# Patient Record
Sex: FEMALE | Race: WHITE | URBAN - METROPOLITAN AREA
[De-identification: names, ages, dates, MRNs, and addresses within clinical notes are randomized per-mention and may not be internally consistent; named-entity substitution may affect disease eponyms.]

---

## 2020-02-24 ENCOUNTER — APPOINTMENT (RX ONLY)
Dept: URBAN - METROPOLITAN AREA CLINIC 23 | Facility: CLINIC | Age: 75
Setting detail: DERMATOLOGY
End: 2020-02-24

## 2020-02-24 DIAGNOSIS — B35.1 TINEA UNGUIUM: ICD-10-CM

## 2020-02-24 DIAGNOSIS — L29.8 OTHER PRURITUS: ICD-10-CM

## 2020-02-24 DIAGNOSIS — D18.0 HEMANGIOMA: ICD-10-CM

## 2020-02-24 DIAGNOSIS — L82.0 INFLAMED SEBORRHEIC KERATOSIS: ICD-10-CM

## 2020-02-24 DIAGNOSIS — L73.9 FOLLICULAR DISORDER, UNSPECIFIED: ICD-10-CM

## 2020-02-24 DIAGNOSIS — L29.89 OTHER PRURITUS: ICD-10-CM

## 2020-02-24 DIAGNOSIS — D22 MELANOCYTIC NEVI: ICD-10-CM

## 2020-02-24 DIAGNOSIS — L81.4 OTHER MELANIN HYPERPIGMENTATION: ICD-10-CM

## 2020-02-24 DIAGNOSIS — L68.0 HIRSUTISM: ICD-10-CM

## 2020-02-24 DIAGNOSIS — L663 OTHER SPECIFIED DISEASES OF HAIR AND HAIR FOLLICLES: ICD-10-CM

## 2020-02-24 DIAGNOSIS — L738 OTHER SPECIFIED DISEASES OF HAIR AND HAIR FOLLICLES: ICD-10-CM

## 2020-02-24 PROBLEM — D18.01 HEMANGIOMA OF SKIN AND SUBCUTANEOUS TISSUE: Status: ACTIVE | Noted: 2020-02-24

## 2020-02-24 PROBLEM — E78.5 HYPERLIPIDEMIA, UNSPECIFIED: Status: ACTIVE | Noted: 2020-02-24

## 2020-02-24 PROBLEM — D22.5 MELANOCYTIC NEVI OF TRUNK: Status: ACTIVE | Noted: 2020-02-24

## 2020-02-24 PROBLEM — M12.9 ARTHROPATHY, UNSPECIFIED: Status: ACTIVE | Noted: 2020-02-24

## 2020-02-24 PROBLEM — L02.02 FURUNCLE OF FACE: Status: ACTIVE | Noted: 2020-02-24

## 2020-02-24 PROCEDURE — ? OTHER

## 2020-02-24 PROCEDURE — 99203 OFFICE O/P NEW LOW 30 MIN: CPT | Mod: 25

## 2020-02-24 PROCEDURE — ? PRESCRIPTION

## 2020-02-24 PROCEDURE — ? LIQUID NITROGEN

## 2020-02-24 PROCEDURE — 17110 DESTRUCTION B9 LES UP TO 14: CPT

## 2020-02-24 PROCEDURE — ? COUNSELING

## 2020-02-24 PROCEDURE — ? TREATMENT REGIMEN

## 2020-02-24 RX ORDER — CLINDAMYCIN PHOSPHATE 10 MG/G
GEL TOPICAL
Qty: 1 | Refills: 0 | Status: ERX | COMMUNITY
Start: 2020-02-24 | End: 2022-01-26

## 2020-02-24 RX ADMIN — CLINDAMYCIN PHOSPHATE: 10 GEL TOPICAL at 00:00

## 2020-02-24 ASSESSMENT — LOCATION DETAILED DESCRIPTION DERM
LOCATION DETAILED: RIGHT CHIN
LOCATION DETAILED: LEFT MID-UPPER BACK
LOCATION DETAILED: LEFT GREAT TOENAIL
LOCATION DETAILED: EPIGASTRIC SKIN
LOCATION DETAILED: RIGHT DISTAL DORSAL FOREARM
LOCATION DETAILED: UPPER STERNUM
LOCATION DETAILED: LEFT INFERIOR LATERAL NECK
LOCATION DETAILED: RIGHT ANTERIOR DISTAL THIGH
LOCATION DETAILED: LEFT DISTAL ULNAR DORSAL FOREARM
LOCATION DETAILED: LEFT INFERIOR MEDIAL UPPER BACK
LOCATION DETAILED: RIGHT DISTAL PRETIBIAL REGION
LOCATION DETAILED: LEFT DISTAL PRETIBIAL REGION
LOCATION DETAILED: LEFT CHIN
LOCATION DETAILED: SUPERIOR THORACIC SPINE

## 2020-02-24 ASSESSMENT — LOCATION SIMPLE DESCRIPTION DERM
LOCATION SIMPLE: LEFT FOREARM
LOCATION SIMPLE: RIGHT PRETIBIAL REGION
LOCATION SIMPLE: LEFT GREAT TOE
LOCATION SIMPLE: LEFT ANTERIOR NECK
LOCATION SIMPLE: RIGHT FOREARM
LOCATION SIMPLE: UPPER BACK
LOCATION SIMPLE: LEFT PRETIBIAL REGION
LOCATION SIMPLE: LEFT UPPER BACK
LOCATION SIMPLE: CHIN
LOCATION SIMPLE: CHEST
LOCATION SIMPLE: RIGHT THIGH
LOCATION SIMPLE: ABDOMEN

## 2020-02-24 ASSESSMENT — LOCATION ZONE DERM
LOCATION ZONE: LEG
LOCATION ZONE: FACE
LOCATION ZONE: NECK
LOCATION ZONE: TOENAIL
LOCATION ZONE: ARM
LOCATION ZONE: TRUNK

## 2020-02-24 NOTE — PROCEDURE: OTHER
Note Text (......Xxx Chief Complaint.): This diagnosis correlates with the
Detail Level: Simple
Other (Free Text): Recommended Laser hair removal

## 2020-02-24 NOTE — PROCEDURE: LIQUID NITROGEN
Consent: The patient's consent was obtained including but not limited to risks of crusting, scabbing, blistering, scarring, darker or lighter pigmentary change, recurrence, incomplete removal and infection.
Include Z78.9 (Other Specified Conditions Influencing Health Status) As An Associated Diagnosis?: No
Detail Level: Detailed
Medical Necessity Information: It is in your best interest to select a reason for this procedure from the list below. All of these items fulfill various CMS LCD requirements except the new and changing color options.
Post-Care Instructions: I reviewed with the patient in detail post-care instructions. Patient is to wear sunprotection, and avoid picking at any of the treated lesions. Pt may apply Vaseline to crusted or scabbing areas.
Medical Necessity Clause: This procedure was medically necessary because the lesions that were treated were:irritated

## 2021-03-30 ENCOUNTER — APPOINTMENT (RX ONLY)
Dept: URBAN - METROPOLITAN AREA CLINIC 24 | Facility: CLINIC | Age: 76
Setting detail: DERMATOLOGY
End: 2021-03-30

## 2021-03-30 DIAGNOSIS — L85.3 XEROSIS CUTIS: ICD-10-CM

## 2021-03-30 DIAGNOSIS — B35.1 TINEA UNGUIUM: ICD-10-CM

## 2021-03-30 DIAGNOSIS — Z41.9 ENCOUNTER FOR PROCEDURE FOR PURPOSES OTHER THAN REMEDYING HEALTH STATE, UNSPECIFIED: ICD-10-CM

## 2021-03-30 DIAGNOSIS — L82.1 OTHER SEBORRHEIC KERATOSIS: ICD-10-CM

## 2021-03-30 DIAGNOSIS — L81.4 OTHER MELANIN HYPERPIGMENTATION: ICD-10-CM

## 2021-03-30 DIAGNOSIS — L57.8 OTHER SKIN CHANGES DUE TO CHRONIC EXPOSURE TO NONIONIZING RADIATION: ICD-10-CM

## 2021-03-30 DIAGNOSIS — D18.0 HEMANGIOMA: ICD-10-CM

## 2021-03-30 DIAGNOSIS — L57.0 ACTINIC KERATOSIS: ICD-10-CM

## 2021-03-30 DIAGNOSIS — D22 MELANOCYTIC NEVI: ICD-10-CM

## 2021-03-30 DIAGNOSIS — L91.8 OTHER HYPERTROPHIC DISORDERS OF THE SKIN: ICD-10-CM

## 2021-03-30 PROBLEM — D18.01 HEMANGIOMA OF SKIN AND SUBCUTANEOUS TISSUE: Status: ACTIVE | Noted: 2021-03-30

## 2021-03-30 PROBLEM — F41.9 ANXIETY DISORDER, UNSPECIFIED: Status: ACTIVE | Noted: 2021-03-30

## 2021-03-30 PROBLEM — I10 ESSENTIAL (PRIMARY) HYPERTENSION: Status: ACTIVE | Noted: 2021-03-30

## 2021-03-30 PROBLEM — D22.5 MELANOCYTIC NEVI OF TRUNK: Status: ACTIVE | Noted: 2021-03-30

## 2021-03-30 PROCEDURE — 17000 DESTRUCT PREMALG LESION: CPT | Mod: 59

## 2021-03-30 PROCEDURE — 11200 RMVL SKIN TAGS UP TO&INC 15: CPT

## 2021-03-30 PROCEDURE — ? OTHER

## 2021-03-30 PROCEDURE — 99214 OFFICE O/P EST MOD 30 MIN: CPT | Mod: 25

## 2021-03-30 PROCEDURE — ? COUNSELING

## 2021-03-30 PROCEDURE — ? LIQUID NITROGEN

## 2021-03-30 PROCEDURE — ? SKIN TAG REMOVAL

## 2021-03-30 ASSESSMENT — LOCATION SIMPLE DESCRIPTION DERM
LOCATION SIMPLE: RIGHT GREAT TOE
LOCATION SIMPLE: RIGHT PRETIBIAL REGION
LOCATION SIMPLE: RIGHT BACK
LOCATION SIMPLE: RIGHT LOWER BACK
LOCATION SIMPLE: LEFT ANTERIOR AXILLA
LOCATION SIMPLE: LEFT UPPER BACK
LOCATION SIMPLE: ABDOMEN
LOCATION SIMPLE: RIGHT FOREARM
LOCATION SIMPLE: LEFT GREAT TOE
LOCATION SIMPLE: LEFT FOREARM
LOCATION SIMPLE: RIGHT POPLITEAL SKIN
LOCATION SIMPLE: UPPER BACK
LOCATION SIMPLE: RIGHT NOSE
LOCATION SIMPLE: CHIN
LOCATION SIMPLE: RIGHT SHOULDER

## 2021-03-30 ASSESSMENT — LOCATION DETAILED DESCRIPTION DERM
LOCATION DETAILED: EPIGASTRIC SKIN
LOCATION DETAILED: RIGHT PROXIMAL DORSAL FOREARM
LOCATION DETAILED: SUBXIPHOID
LOCATION DETAILED: LEFT MEDIAL UPPER BACK
LOCATION DETAILED: RIGHT GREAT TOENAIL
LOCATION DETAILED: RIGHT DISTAL PRETIBIAL REGION
LOCATION DETAILED: RIGHT NASAL SIDEWALL
LOCATION DETAILED: RIGHT POPLITEAL SKIN
LOCATION DETAILED: RIGHT CHIN
LOCATION DETAILED: INFERIOR THORACIC SPINE
LOCATION DETAILED: LEFT PROXIMAL DORSAL FOREARM
LOCATION DETAILED: LEFT GREAT TOENAIL
LOCATION DETAILED: RIGHT SUPERIOR LATERAL UPPER BACK
LOCATION DETAILED: RIGHT INFERIOR LATERAL MIDBACK
LOCATION DETAILED: LEFT ANTERIOR AXILLA
LOCATION DETAILED: PERIUMBILICAL SKIN
LOCATION DETAILED: RIGHT PROXIMAL PRETIBIAL REGION
LOCATION DETAILED: RIGHT ANTERIOR SHOULDER

## 2021-03-30 ASSESSMENT — LOCATION ZONE DERM
LOCATION ZONE: LEG
LOCATION ZONE: TOENAIL
LOCATION ZONE: TRUNK
LOCATION ZONE: NOSE
LOCATION ZONE: ARM
LOCATION ZONE: FACE
LOCATION ZONE: AXILLAE

## 2021-03-30 NOTE — PROCEDURE: SKIN TAG REMOVAL
Anesthesia Type: 1% lidocaine with epinephrine and a 1:10 solution of 8.4% sodium bicarbonate
Include Z78.9 (Other Specified Conditions Influencing Health Status) As An Associated Diagnosis?: No
Consent: Written consent obtained and the risks of skin tag removal was reviewed with the patient including but not limited to bleeding, pigmentary change, infection, pain, and remote possibility of scarring.
Add Associated Diagnoses If Applicable When Selecting Medical Necessity: Yes
Medical Necessity Clause: This procedure was medically necessary because the lesions that were treated were:rubbed by patient, deodorant
Detail Level: Detailed
Hemostasis: Aluminum Chloride
Medical Necessity Information: It is in your best interest to select a reason for this procedure from the list below. All of these items fulfill various CMS LCD requirements except the new and changing color options.
Anesthesia Volume In Cc: 1

## 2021-03-30 NOTE — PROCEDURE: OTHER
Note Text (......Xxx Chief Complaint.): This diagnosis correlates with the
Detail Level: Simple
Other (Free Text): Pt was given 2 options for treatment. Vaniqa to laser hair removal.
Other (Free Text): Pt recommended to use CeraVe anti-itch.
Render Risk Assessment In Note?: no
Other (Free Text): Pt was recommended to see Dr. Miranda.

## 2022-01-26 ENCOUNTER — APPOINTMENT (RX ONLY)
Dept: URBAN - METROPOLITAN AREA CLINIC 23 | Facility: CLINIC | Age: 77
Setting detail: DERMATOLOGY
End: 2022-01-26

## 2022-01-26 DIAGNOSIS — L82.1 OTHER SEBORRHEIC KERATOSIS: ICD-10-CM

## 2022-01-26 DIAGNOSIS — D22 MELANOCYTIC NEVI: ICD-10-CM

## 2022-01-26 DIAGNOSIS — L57.8 OTHER SKIN CHANGES DUE TO CHRONIC EXPOSURE TO NONIONIZING RADIATION: ICD-10-CM

## 2022-01-26 DIAGNOSIS — D18.0 HEMANGIOMA: ICD-10-CM

## 2022-01-26 DIAGNOSIS — L82.0 INFLAMED SEBORRHEIC KERATOSIS: ICD-10-CM

## 2022-01-26 PROBLEM — D18.01 HEMANGIOMA OF SKIN AND SUBCUTANEOUS TISSUE: Status: ACTIVE | Noted: 2022-01-26

## 2022-01-26 PROBLEM — F32.9 MAJOR DEPRESSIVE DISORDER, SINGLE EPISODE, UNSPECIFIED: Status: ACTIVE | Noted: 2022-01-26

## 2022-01-26 PROBLEM — D22.5 MELANOCYTIC NEVI OF TRUNK: Status: ACTIVE | Noted: 2022-01-26

## 2022-01-26 PROCEDURE — ? COUNSELING

## 2022-01-26 PROCEDURE — ? LIQUID NITROGEN

## 2022-01-26 PROCEDURE — 99213 OFFICE O/P EST LOW 20 MIN: CPT | Mod: 25

## 2022-01-26 PROCEDURE — 17110 DESTRUCTION B9 LES UP TO 14: CPT

## 2022-01-26 ASSESSMENT — LOCATION DETAILED DESCRIPTION DERM
LOCATION DETAILED: PERIUMBILICAL SKIN
LOCATION DETAILED: RIGHT POPLITEAL SKIN
LOCATION DETAILED: RIGHT PROXIMAL DORSAL FOREARM
LOCATION DETAILED: LEFT INFERIOR LATERAL NECK
LOCATION DETAILED: SUBXIPHOID
LOCATION DETAILED: RIGHT INFERIOR LATERAL NECK
LOCATION DETAILED: LEFT CLAVICULAR NECK
LOCATION DETAILED: RIGHT CLAVICULAR NECK
LOCATION DETAILED: RIGHT DISTAL PRETIBIAL REGION
LOCATION DETAILED: LEFT MEDIAL UPPER BACK
LOCATION DETAILED: RIGHT INFERIOR LATERAL MIDBACK
LOCATION DETAILED: LEFT PROXIMAL DORSAL FOREARM
LOCATION DETAILED: EPIGASTRIC SKIN
LOCATION DETAILED: INFERIOR THORACIC SPINE

## 2022-01-26 ASSESSMENT — LOCATION ZONE DERM
LOCATION ZONE: NECK
LOCATION ZONE: ARM
LOCATION ZONE: LEG
LOCATION ZONE: TRUNK

## 2022-01-26 ASSESSMENT — LOCATION SIMPLE DESCRIPTION DERM
LOCATION SIMPLE: LEFT ANTERIOR NECK
LOCATION SIMPLE: LEFT UPPER BACK
LOCATION SIMPLE: RIGHT POPLITEAL SKIN
LOCATION SIMPLE: RIGHT ANTERIOR NECK
LOCATION SIMPLE: UPPER BACK
LOCATION SIMPLE: RIGHT FOREARM
LOCATION SIMPLE: ABDOMEN
LOCATION SIMPLE: LEFT FOREARM
LOCATION SIMPLE: RIGHT PRETIBIAL REGION
LOCATION SIMPLE: RIGHT LOWER BACK

## 2022-01-26 NOTE — PROCEDURE: LIQUID NITROGEN
Show Applicator Variable?: Yes
Medical Necessity Clause: This procedure was medically necessary because the lesions that were treated were:irritated
Consent: The patient's consent was obtained including but not limited to risks of crusting, scabbing, blistering, scarring, darker or lighter pigmentary change, recurrence, incomplete removal and infection.
Include Z78.9 (Other Specified Conditions Influencing Health Status) As An Associated Diagnosis?: No
Medical Necessity Information: It is in your best interest to select a reason for this procedure from the list below. All of these items fulfill various CMS LCD requirements except the new and changing color options.
Detail Level: Detailed
Post-Care Instructions: I reviewed with the patient in detail post-care instructions. Patient is to wear sunprotection, and avoid picking at any of the treated lesions. Pt may apply Vaseline to crusted or scabbing areas.
Spray Paint Text: The liquid nitrogen was applied to the skin utilizing a spray paint frosting technique.

## 2022-07-13 ENCOUNTER — APPOINTMENT (RX ONLY)
Dept: URBAN - METROPOLITAN AREA CLINIC 330 | Facility: CLINIC | Age: 77
Setting detail: DERMATOLOGY
End: 2022-07-13

## 2022-07-13 DIAGNOSIS — D18.0 HEMANGIOMA: ICD-10-CM

## 2022-07-13 DIAGNOSIS — L91.8 OTHER HYPERTROPHIC DISORDERS OF THE SKIN: ICD-10-CM

## 2022-07-13 DIAGNOSIS — L82.1 OTHER SEBORRHEIC KERATOSIS: ICD-10-CM

## 2022-07-13 PROBLEM — D18.01 HEMANGIOMA OF SKIN AND SUBCUTANEOUS TISSUE: Status: ACTIVE | Noted: 2022-07-13

## 2022-07-13 PROCEDURE — ? LIQUID NITROGEN (COSMETIC)

## 2022-07-13 PROCEDURE — ? OTHER

## 2022-07-13 PROCEDURE — ? COUNSELING

## 2022-07-13 PROCEDURE — ? SKIN TAG REMOVAL (COSMETIC)

## 2022-07-13 PROCEDURE — ? BENIGN DESTRUCTION COSMETIC

## 2022-07-13 ASSESSMENT — LOCATION SIMPLE DESCRIPTION DERM
LOCATION SIMPLE: LEFT SHOULDER
LOCATION SIMPLE: POSTERIOR NECK
LOCATION SIMPLE: RIGHT ANTERIOR NECK
LOCATION SIMPLE: SUPERIOR FOREHEAD
LOCATION SIMPLE: RIGHT SHOULDER

## 2022-07-13 ASSESSMENT — LOCATION ZONE DERM
LOCATION ZONE: ARM
LOCATION ZONE: FACE
LOCATION ZONE: NECK

## 2022-07-13 ASSESSMENT — LOCATION DETAILED DESCRIPTION DERM
LOCATION DETAILED: RIGHT POSTERIOR NECK
LOCATION DETAILED: SUPERIOR MID FOREHEAD
LOCATION DETAILED: RIGHT CLAVICULAR NECK
LOCATION DETAILED: LEFT ANTERIOR SHOULDER
LOCATION DETAILED: RIGHT ANTERIOR SHOULDER

## 2022-07-13 NOTE — PROCEDURE: OTHER
Detail Level: Generalized
Other (Free Text): Patient consent was obtained to proceed with the visit and recommended plan of care after discussion of all risks and benefits, including the risks of COVID-19 exposure.
Note Text (......Xxx Chief Complaint.): This diagnosis correlates with the
Render Risk Assessment In Note?: yes
Other (Free Text): Discussed cosmetic removal, quoted patient $10 per lesion, patient would like removal today

## 2022-07-13 NOTE — PROCEDURE: SKIN TAG REMOVAL (COSMETIC)
Price (Use Numbers Only, No Special Characters Or $): 10
Detail Level: Detailed
Removed With: gradle excision
Anesthesia Volume In Cc: 3
Consent: Written consent obtained and the risks of skin tag removal was reviewed with the patient including but not limited to bleeding, pigmentary change, infection, pain, and remote possibility of scarring.

## 2022-07-13 NOTE — PROCEDURE: MIPS QUALITY
Quality 111:Pneumonia Vaccination Status For Older Adults: Pneumococcal vaccine administered on or after patient’s 60th birthday and before the end of the measurement period
Detail Level: Detailed
Quality 47: Advance Care Plan: Advance care planning not documented, reason not otherwise specified.
Quality 110: Preventive Care And Screening: Influenza Immunization: Influenza Immunization Administered during Influenza season
Quality 130: Documentation Of Current Medications In The Medical Record: Current Medications Documented
Quality 431: Preventive Care And Screening: Unhealthy Alcohol Use - Screening: Patient not identified as an unhealthy alcohol user when screened for unhealthy alcohol use using a systematic screening method
Quality 226: Preventive Care And Screening: Tobacco Use: Screening And Cessation Intervention: Patient screened for tobacco use and is an ex/non-smoker
Quality 402: Tobacco Use And Help With Quitting Among Adolescents: Patient screened for tobacco and never smoked

## 2022-07-13 NOTE — PROCEDURE: LIQUID NITROGEN (COSMETIC)
Post-Care Instructions: I reviewed with the patient in detail post-care instructions. Patient is to wear sunprotection, and avoid picking at any of the treated lesions. Pt may apply Vaseline to crusted or scabbing areas.
Render Post-Care Instructions In Note?: no
Price (Use Numbers Only, No Special Characters Or $): 20
Consent: The patient's consent was obtained including but not limited to risks of crusting, scabbing, blistering, scarring, darker or lighter pigmentary change, recurrence, incomplete removal and infection. The patient understands that the procedure is cosmetic in nature and is not covered by insurance.
Billing Information: Bill by Static Price
Show Spray Paint Technique Variable?: Yes
Spray Paint Text: The liquid nitrogen was applied to the skin utilizing a spray paint frosting technique.
Detail Level: Detailed

## 2022-07-13 NOTE — PROCEDURE: BENIGN DESTRUCTION COSMETIC
Consent: The patient's consent was obtained including but not limited to risks of crusting, scabbing, blistering, scarring, darker or lighter pigmentary change, recurrence, incomplete removal and infection.
Price (Use Numbers Only, No Special Characters Or $): 20
Post-Care Instructions: I reviewed with the patient in detail post-care instructions. Patient is to wear sunprotection, and avoid picking at any of the treated lesions. Pt may apply Vaseline to crusted or scabbing areas.
Detail Level: Detailed
Anesthesia Volume In Cc: 0.5

## 2022-10-07 PROBLEM — J30.2 SEASONAL ALLERGIC RHINITIS: Status: ACTIVE | Noted: 2022-10-07

## 2022-10-07 PROBLEM — R61 DIAPHORESIS: Status: ACTIVE | Noted: 2022-06-18

## 2022-10-07 PROBLEM — R63.4 WEIGHT LOSS: Status: ACTIVE | Noted: 2022-06-18

## 2023-02-24 PROBLEM — M54.12 CERVICAL RADICULOPATHY: Status: ACTIVE | Noted: 2023-02-24

## 2023-03-07 DIAGNOSIS — M19.011 PRIMARY OSTEOARTHRITIS, RIGHT SHOULDER: ICD-10-CM

## 2023-03-29 PROBLEM — J01.90 ACUTE SINUSITIS: Status: ACTIVE | Noted: 2021-09-21

## 2023-04-05 PROBLEM — M75.101 RIGHT ROTATOR CUFF TEAR ARTHROPATHY: Status: ACTIVE | Noted: 2023-04-05

## 2023-04-05 PROBLEM — M12.811 RIGHT ROTATOR CUFF TEAR ARTHROPATHY: Status: ACTIVE | Noted: 2023-04-05

## 2023-04-05 PROBLEM — M19.011 PRIMARY OSTEOARTHRITIS OF RIGHT SHOULDER: Status: ACTIVE | Noted: 2023-04-05

## 2023-04-24 ENCOUNTER — HOSPITAL ENCOUNTER (OUTPATIENT)
Dept: PREADMISSION TESTING | Age: 78
Discharge: HOME | End: 2023-04-27
Payer: MEDICARE

## 2023-04-24 VITALS
WEIGHT: 151.9 LBS | TEMPERATURE: 97.3 F | OXYGEN SATURATION: 96 % | SYSTOLIC BLOOD PRESSURE: 138 MMHG | HEIGHT: 67 IN | DIASTOLIC BLOOD PRESSURE: 77 MMHG | BODY MASS INDEX: 23.84 KG/M2 | HEART RATE: 67 BPM | RESPIRATION RATE: 16 BRPM

## 2023-04-24 LAB
ANION GAP SERPL CALC-SCNC: 2 MMOL/L (ref 2–11)
BACTERIA SPEC CULT: NORMAL
BUN SERPL-MCNC: 15 MG/DL (ref 8–23)
CALCIUM SERPL-MCNC: 9.5 MG/DL (ref 8.3–10.4)
CHLORIDE SERPL-SCNC: 106 MMOL/L (ref 101–110)
CO2 SERPL-SCNC: 28 MMOL/L (ref 21–32)
CREAT SERPL-MCNC: 0.51 MG/DL (ref 0.6–1)
EKG ATRIAL RATE: 60 BPM
EKG DIAGNOSIS: NORMAL
EKG P AXIS: 62 DEGREES
EKG P-R INTERVAL: 166 MS
EKG Q-T INTERVAL: 402 MS
EKG QRS DURATION: 94 MS
EKG QTC CALCULATION (BAZETT): 402 MS
EKG R AXIS: -21 DEGREES
EKG T AXIS: 32 DEGREES
EKG VENTRICULAR RATE: 60 BPM
ERYTHROCYTE [DISTWIDTH] IN BLOOD BY AUTOMATED COUNT: 16.7 % (ref 11.9–14.6)
GLUCOSE SERPL-MCNC: 98 MG/DL (ref 65–100)
HCT VFR BLD AUTO: 33.8 % (ref 35.8–46.3)
HGB BLD-MCNC: 10.6 G/DL (ref 11.7–15.4)
MCH RBC QN AUTO: 27.1 PG (ref 26.1–32.9)
MCHC RBC AUTO-ENTMCNC: 31.4 G/DL (ref 31.4–35)
MCV RBC AUTO: 86.4 FL (ref 82–102)
NRBC # BLD: 0 K/UL (ref 0–0.2)
PLATELET # BLD AUTO: 425 K/UL (ref 150–450)
PMV BLD AUTO: 9.4 FL (ref 9.4–12.3)
POTASSIUM SERPL-SCNC: 3.9 MMOL/L (ref 3.5–5.1)
RBC # BLD AUTO: 3.91 M/UL (ref 4.05–5.2)
SERVICE CMNT-IMP: NORMAL
SODIUM SERPL-SCNC: 136 MMOL/L (ref 133–143)
WBC # BLD AUTO: 6.2 K/UL (ref 4.3–11.1)

## 2023-04-24 PROCEDURE — 87641 MR-STAPH DNA AMP PROBE: CPT

## 2023-04-24 PROCEDURE — 93005 ELECTROCARDIOGRAM TRACING: CPT | Performed by: ANESTHESIOLOGY

## 2023-04-24 PROCEDURE — 80048 BASIC METABOLIC PNL TOTAL CA: CPT

## 2023-04-24 PROCEDURE — 85027 COMPLETE CBC AUTOMATED: CPT

## 2023-04-24 RX ORDER — TRAMADOL HYDROCHLORIDE 50 MG/1
50 TABLET ORAL DAILY
COMMUNITY
End: 2023-04-28 | Stop reason: SDUPTHER

## 2023-04-24 ASSESSMENT — PULMONARY FUNCTION TESTS
FEV1 (LITERS): 1.73
FEV1 (%PREDICTED): 87

## 2023-04-24 NOTE — PROGRESS NOTES
04/24/23 1400   Treatment   Treatment Type Bedside spirometry   Oxygen Therapy/Pulse Ox   O2 Therapy Room air   Heart Rate 67   SpO2 96 %   Pulse Oximeter Device Mode Intermittent   $Pulse Oximeter $Spot check (single)   Bedside Spirometry   FEV-1/Actual (Liters) 1.73 Liters   FEV-1/Predicted (Liters) 87 Liters     Initial respiratory Assessment completed with pt. Pt was interviewed and evaluated in [facility] prior to surgery. Patient ID:  Brandon Michaud  [id number]  15 y.o.  1945  Surgeon:  Elzbieta Cobos  Date of Surgery: [email or web address]  Procedure: Total Right Shoulder Arthroplasty  Primary Care Physician: Adam Galicia [phone]  Specialists:     BS:CLEAR    Pt taught proper COUGH technique  IS REVIEWED WITH PT AS WELL AS BENEFITS OF USING IS IN SEDENTARY PTS.   DIAPHRAGMATIC BREATHING EXERCISE INSTRUCTIONS GIVEN    History of smoking:   DENIES                 Quit date:         Secondhand smoke:FATHER & SPOUSE    Past procedures with Oxygen desaturation or delayed awakening:DENIES    Past Medical History:   Diagnosis Date    Allergic rhinitis     Anxiety     AR (allergic rhinitis)     Depression     Headache(784.0)     History of osteoporosis 8/20/2015    Overview:  12/14 BMD osteopenia  Last Assessment & Plan:  Continue current regimen     Hypercholesterolemia     Hypertension     Insomnia     Left arm pain 2/3/2016    Migraine without status migrainosus, not intractable     Osteoarthritis     Osteoporosis     Parkinson's disease (HCC)     PONV (postoperative nausea and vomiting)     RLS (restless legs syndrome)     Tremor         Respiratory history:DENIES SOB                                                                   Respiratory meds:  DENIES    FAMILY PRESENT:            PAST SLEEP STUDY:                        DENIES  HX OF LEEANNE:                                        DENIES  LEEANNE assessment:     DANGERS OF UNTREATED LEEANNE EXPLAINED TO PT.
All labs reviewed and within anesthesia guidelines, no follow-up required.
Patient verified name and     Order for consent NOT found in EHR; patient verified. Type 3 surgery, walk-in assessment complete. Labs per surgeon: MRSA/MSSA; results pending  Labs per anesthesia protocol: CBC, BMP, T/S s/h dos; results pending  [id number]    MRSA/MSSA swab collected; pharmacy to review and dose antibiotic as appropriate. Respiratory Therapist seen pt today. Hospital approved surgical skin cleanser and instructions given per hospital policy. Patient provided with and instructed on educational handouts including Guide to Surgery, Pain Management, Hand Hygiene, Blood Transfusion Education, and [facility] Brochure. Patient answered medical/surgical history questions at their best of ability. All prior to admission medications documented in [facility]. Original medication prescription bottle NOT visualized during patient appointment. Patient instructed to hold all vitamins 7 days prior to surgery and NSAIDS 5 days prior to surgery, patient verbalized understanding. Patient teach back successful and patient demonstrates knowledge of instructions. How to Use Your Incentive Spirometer       About Your Incentive Spirometer  An incentive spirometer is a device that will expand your lungs by helping you to breathe more deeply and fully. The parts of your incentive spirometer are labeled in Figure 1. Using your incentive spirometer  When you're using your incentive spirometer, make sure to breathe through your mouth. If you breathe through your nose, the incentive spirometer won't work properly. You can hold your nose if you have trouble. DO NOT BLOW INTO THE DEVICE. If you feel dizzy at any time, stop and rest. Try again at a later time. Sit upright in a chair or in bed. Hold the incentive spirometer at eye level. Put the mouthpiece in your mouth and close your lips tightly around it. Slowly breathe out (exhale) completely.   Breathe in (inhale) slowly
(PROLIA) 60 MG/ML SOSY SC injection 60 mg Subcutaneous q 6 months 12/2/20   Historical Provider, MD   carbidopa-levodopa (SINEMET)  MG per tablet Take 1.5 tabs TID  Patient taking differently: Take 1 tablet by mouth 3 times daily 2/17/23   Juan Arenas MD   rasagiline mesylate 1 MG TABS Take 1 tablet by mouth daily  Patient taking differently: Take 1 tablet by mouth daily Azilect 1/25/23   Juan Arenas MD   carbidopa-levodopa (SINEMET CR)  MG per extended release tablet 1 tab Qhs  Patient taking differently: Take 1 tablet by mouth nightly 1/18/23   Juan Arenas MD   pramipexole (MIRAPEX) 0.5 MG tablet 1 tab at 6pm and Qhs 10/21/22   Juan Arenas MD   acetaminophen (TYLENOL) 325 MG tablet Take by mouth every 4 hours as needed    Ar Automatic Reconciliation   escitalopram (LEXAPRO) 20 MG tablet Take 20 mg by mouth daily 7/6/16   Ar Automatic Reconciliation   glucosamine-chondroitin 750-600 MG TABS tablet Take by mouth daily    Ar Automatic Reconciliation   lactase (LACTAID) 3000 units tablet Take 1 tablet by mouth as needed    Ar Automatic Reconciliation   melatonin 3 MG TABS tablet Take by mouth    Ar Automatic Reconciliation   Simethicone 125 MG CAPS Take 125 mg by mouth 4 times daily as needed    Ar Automatic Reconciliation   SUMAtriptan (IMITREX) 50 MG tablet Take 50 mg by mouth once as needed    Ar Automatic Reconciliation     Allergies   Allergen Reactions    Sulfa Antibiotics Nausea And Vomiting and Nausea Only          Objective:     Physical Exam:   Patient Vitals for the past 24 hrs:   Temp Pulse Resp BP SpO2   04/24/23 1309 97.3 °F (36.3 °C) 69 16 138/77 95 %       ECG:    Encounter Date: 04/24/23   EKG 12 Lead   Result Value    Ventricular Rate 60    Atrial Rate 60    P-R Interval 166    QRS Duration 94    Q-T Interval 402    QTc Calculation (Bazett) 402    P Axis 62    R Axis -21    T Axis 32    Diagnosis      Normal sinus rhythm  Normal ECG  No previous ECGs

## 2023-04-27 ENCOUNTER — TELEPHONE (OUTPATIENT)
Dept: ORTHOPEDIC SURGERY | Age: 78
End: 2023-04-27
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
## 2023-04-28 ENCOUNTER — OFFICE VISIT (OUTPATIENT)
Dept: NEUROLOGY | Age: 78
End: 2023-04-28

## 2023-04-28 VITALS
HEIGHT: 67 IN | WEIGHT: 152 LBS | BODY MASS INDEX: 23.86 KG/M2 | SYSTOLIC BLOOD PRESSURE: 122 MMHG | OXYGEN SATURATION: 97 % | DIASTOLIC BLOOD PRESSURE: 80 MMHG | HEART RATE: 64 BPM

## 2023-04-28 DIAGNOSIS — R25.1 TREMOR: ICD-10-CM

## 2023-04-28 DIAGNOSIS — Z79.899 ENCOUNTER FOR LONG-TERM (CURRENT) USE OF HIGH-RISK MEDICATION: ICD-10-CM

## 2023-04-28 DIAGNOSIS — G47.52 RBD (REM BEHAVIORAL DISORDER): ICD-10-CM

## 2023-04-28 DIAGNOSIS — G20 PARKINSON DISEASE (HCC): ICD-10-CM

## 2023-04-28 DIAGNOSIS — G25.81 RESTLESS LEGS SYNDROME: ICD-10-CM

## 2023-04-28 DIAGNOSIS — M79.622 PAIN OF LEFT UPPER ARM: Primary | ICD-10-CM

## 2023-04-28 RX ORDER — CARBIDOPA AND LEVODOPA 50; 200 MG/1; MG/1
TABLET, EXTENDED RELEASE ORAL
Qty: 90 TABLET | Refills: 3 | Status: SHIPPED | OUTPATIENT
Start: 2023-04-28

## 2023-04-28 RX ORDER — TRAMADOL HYDROCHLORIDE 50 MG/1
50 TABLET ORAL DAILY
Qty: 30 TABLET | Refills: 0 | Status: SHIPPED | OUTPATIENT
Start: 2023-04-28 | End: 2023-05-28

## 2023-04-28 RX ORDER — PRAMIPEXOLE DIHYDROCHLORIDE 0.5 MG/1
TABLET ORAL
Qty: 180 TABLET | Refills: 3 | Status: SHIPPED | OUTPATIENT
Start: 2023-04-28

## 2023-04-28 RX ORDER — RASAGILINE 1 MG/1
1 TABLET ORAL DAILY
Qty: 90 TABLET | Refills: 3 | Status: SHIPPED | OUTPATIENT
Start: 2023-04-28

## 2023-04-28 ASSESSMENT — ENCOUNTER SYMPTOMS
BACK PAIN: 0
CONSTIPATION: 1

## 2023-04-28 NOTE — PROGRESS NOTES
04/28/23  Tamica Singleton        Chief Complaint:  Chief Complaint   Patient presents with    Follow-up     Parkinson disease        Parkinson's Disease Diagnosed: 2014 with left hand tremor and balance issues      HPI:   Tamica Singleton, 77 y.o.,female here in clinic follow up for continued management of Parkinson's Disease. She was set up for shoulder surgery on Monday but was cancelled and moved to May 8th. Recs for surgery:   Azilect you stop the week before surgery since she is having anesthesia. The day of surgery she is to continue her PD meds and not stop them to avoid return of tremor and rigidity upon awakening from surgery. Sinemet is working well for her. She denies wearing off between doses. She is sleeping off and on and can wake up early AM. But it varies. Balance is stable. Current Neuro related meds:  Sinemet 25/100 mg 1.5 tab TID (0600, 1200, and 600)  Sinemet CR 50/200 mg 1 tabs QHS  Azilect 1 mg daily  Mirapex 0.5mg at 6pm and bedtime         Review of Systems   HENT:  Negative for hearing loss and tinnitus. Eyes:  Negative for visual disturbance. Gastrointestinal:  Positive for constipation. Musculoskeletal:  Positive for neck pain. Negative for back pain and gait problem. Neurological:  Positive for speech difficulty and headaches. Negative for dizziness, tremors, weakness, light-headedness and numbness. Psychiatric/Behavioral:  Positive for sleep disturbance. Negative for behavioral problems and hallucinations. Patient denies:  dizziness or light headedness,  drooling or swallowing issues  constipation,   hallucinations/ visual illusions or impulse control disorder   recent falls.    RBD     RLS    Past Medical History:   Diagnosis Date    Allergic rhinitis     Anxiety     AR (allergic rhinitis)     Depression     Headache(784.0)     History of osteoporosis 8/20/2015    Overview:  12/14 BMD osteopenia  Last Assessment & Plan:

## 2023-04-28 NOTE — PATIENT INSTRUCTIONS
Recs for surgery:   Azilect:  stop the week before surgery since she is having anesthesia. And can restart the day after surgery  The day of surgery she is to continue her sinemet as prescribed even if NPO. Do not stop them to avoid withdrawal and the return of tremor and rigidity upon awakening from surgery. Tremor can even be increased after anesthesia.

## 2023-05-07 ENCOUNTER — ANESTHESIA EVENT (OUTPATIENT)
Dept: SURGERY | Age: 78
End: 2023-05-07
Payer: MEDICARE

## 2023-05-07 DIAGNOSIS — M19.011 PRIMARY OSTEOARTHRITIS, RIGHT SHOULDER: Primary | ICD-10-CM

## 2023-05-07 RX ORDER — HYDROMORPHONE HYDROCHLORIDE 2 MG/ML
0.5 INJECTION, SOLUTION INTRAMUSCULAR; INTRAVENOUS; SUBCUTANEOUS
Status: CANCELLED | OUTPATIENT
Start: 2023-05-07

## 2023-05-07 RX ORDER — HYDROMORPHONE HYDROCHLORIDE 2 MG/ML
1 INJECTION, SOLUTION INTRAMUSCULAR; INTRAVENOUS; SUBCUTANEOUS
Status: CANCELLED | OUTPATIENT
Start: 2023-05-07

## 2023-05-07 RX ORDER — OXYCODONE AND ACETAMINOPHEN 7.5; 325 MG/1; MG/1
1-2 TABLET ORAL
Qty: 36 TABLET | Refills: 0 | Status: SHIPPED | OUTPATIENT
Start: 2023-05-07 | End: 2023-05-10

## 2023-05-07 RX ORDER — ONDANSETRON 8 MG/1
4 TABLET, ORALLY DISINTEGRATING ORAL EVERY 6 HOURS
Qty: 16 TABLET | Refills: 0 | Status: SHIPPED | OUTPATIENT
Start: 2023-05-07

## 2023-05-07 RX ORDER — AMOXICILLIN 250 MG
1 CAPSULE ORAL DAILY
Qty: 21 TABLET | Refills: 0 | Status: SHIPPED | OUTPATIENT
Start: 2023-05-07

## 2023-05-07 RX ORDER — ASPIRIN 325 MG
325 TABLET ORAL 2 TIMES DAILY
Qty: 14 TABLET | Refills: 0 | Status: SHIPPED | OUTPATIENT
Start: 2023-05-07 | End: 2023-05-14

## 2023-05-08 ENCOUNTER — HOSPITAL ENCOUNTER (OUTPATIENT)
Age: 78
Discharge: HOME | End: 2023-05-09
Attending: ORTHOPAEDIC SURGERY | Admitting: ORTHOPAEDIC SURGERY
Payer: MEDICARE

## 2023-05-08 ENCOUNTER — ANESTHESIA (OUTPATIENT)
Dept: SURGERY | Age: 78
End: 2023-05-08
Payer: MEDICARE

## 2023-05-08 ENCOUNTER — APPOINTMENT (OUTPATIENT)
Dept: GENERAL RADIOLOGY | Age: 78
End: 2023-05-08
Attending: ORTHOPAEDIC SURGERY
Payer: MEDICARE

## 2023-05-08 DIAGNOSIS — M19.011 PRIMARY OSTEOARTHRITIS OF RIGHT SHOULDER: ICD-10-CM

## 2023-05-08 DIAGNOSIS — M12.811 RIGHT ROTATOR CUFF TEAR ARTHROPATHY: ICD-10-CM

## 2023-05-08 DIAGNOSIS — M75.101 RIGHT ROTATOR CUFF TEAR ARTHROPATHY: ICD-10-CM

## 2023-05-08 LAB
ABO + RH BLD: NORMAL
BLOOD GROUP ANTIBODIES SERPL: NORMAL
SPECIMEN EXP DATE BLD: NORMAL

## 2023-05-08 PROCEDURE — 3700000000 HC ANESTHESIA ATTENDED CARE: Performed by: ORTHOPAEDIC SURGERY

## 2023-05-08 PROCEDURE — 2580000003 HC RX 258: Performed by: PHYSICIAN ASSISTANT

## 2023-05-08 PROCEDURE — 64415 NJX AA&/STRD BRCH PLXS IMG: CPT | Performed by: ANESTHESIOLOGY

## 2023-05-08 PROCEDURE — 6360000002 HC RX W HCPCS: Performed by: NURSE ANESTHETIST, CERTIFIED REGISTERED

## 2023-05-08 PROCEDURE — 6360000002 HC RX W HCPCS: Performed by: ANESTHESIOLOGY

## 2023-05-08 PROCEDURE — 2500000003 HC RX 250 WO HCPCS: Performed by: NURSE ANESTHETIST, CERTIFIED REGISTERED

## 2023-05-08 PROCEDURE — 3600000005 HC SURGERY LEVEL 5 BASE: Performed by: ORTHOPAEDIC SURGERY

## 2023-05-08 PROCEDURE — C1713 ANCHOR/SCREW BN/BN,TIS/BN: HCPCS | Performed by: ORTHOPAEDIC SURGERY

## 2023-05-08 PROCEDURE — 2709999900 HC NON-CHARGEABLE SUPPLY: Performed by: ORTHOPAEDIC SURGERY

## 2023-05-08 PROCEDURE — 6370000000 HC RX 637 (ALT 250 FOR IP): Performed by: ORTHOPAEDIC SURGERY

## 2023-05-08 PROCEDURE — 2500000003 HC RX 250 WO HCPCS: Performed by: ANESTHESIOLOGY

## 2023-05-08 PROCEDURE — 6360000002 HC RX W HCPCS: Performed by: PHYSICIAN ASSISTANT

## 2023-05-08 PROCEDURE — 6370000000 HC RX 637 (ALT 250 FOR IP): Performed by: PHYSICIAN ASSISTANT

## 2023-05-08 PROCEDURE — 86850 RBC ANTIBODY SCREEN: CPT

## 2023-05-08 PROCEDURE — 23472 RECONSTRUCT SHOULDER JOINT: CPT | Performed by: ORTHOPAEDIC SURGERY

## 2023-05-08 PROCEDURE — 73020 X-RAY EXAM OF SHOULDER: CPT

## 2023-05-08 PROCEDURE — 3700000001 HC ADD 15 MINUTES (ANESTHESIA): Performed by: ORTHOPAEDIC SURGERY

## 2023-05-08 PROCEDURE — 7100000001 HC PACU RECOVERY - ADDTL 15 MIN: Performed by: ORTHOPAEDIC SURGERY

## 2023-05-08 PROCEDURE — 86901 BLOOD TYPING SEROLOGIC RH(D): CPT

## 2023-05-08 PROCEDURE — 86900 BLOOD TYPING SEROLOGIC ABO: CPT

## 2023-05-08 PROCEDURE — 6360000002 HC RX W HCPCS: Performed by: ORTHOPAEDIC SURGERY

## 2023-05-08 PROCEDURE — 3600000015 HC SURGERY LEVEL 5 ADDTL 15MIN: Performed by: ORTHOPAEDIC SURGERY

## 2023-05-08 PROCEDURE — 2580000003 HC RX 258: Performed by: ANESTHESIOLOGY

## 2023-05-08 PROCEDURE — 2500000003 HC RX 250 WO HCPCS: Performed by: ORTHOPAEDIC SURGERY

## 2023-05-08 PROCEDURE — 7100000000 HC PACU RECOVERY - FIRST 15 MIN: Performed by: ORTHOPAEDIC SURGERY

## 2023-05-08 PROCEDURE — C1776 JOINT DEVICE (IMPLANTABLE): HCPCS | Performed by: ORTHOPAEDIC SURGERY

## 2023-05-08 DEVICE — IMPLANTABLE DEVICE: Type: IMPLANTABLE DEVICE | Site: SHOULDER | Status: FUNCTIONAL

## 2023-05-08 DEVICE — COMPONENT TOT SHLDR CAPPED S3EXACTECH] EXACTECH INC]: Type: IMPLANTABLE DEVICE | Status: FUNCTIONAL

## 2023-05-08 DEVICE — IMPLANTABLE DEVICE
Type: IMPLANTABLE DEVICE | Site: SHOULDER | Status: FUNCTIONAL
Brand: EQUINOXE

## 2023-05-08 DEVICE — HUMERAL LINER
Type: IMPLANTABLE DEVICE | Site: SHOULDER | Status: FUNCTIONAL
Brand: EQUINOXE

## 2023-05-08 RX ORDER — KETOROLAC TROMETHAMINE 30 MG/ML
INJECTION, SOLUTION INTRAMUSCULAR; INTRAVENOUS PRN
Status: DISCONTINUED | OUTPATIENT
Start: 2023-05-08 | End: 2023-05-08 | Stop reason: ALTCHOICE

## 2023-05-08 RX ORDER — SODIUM CHLORIDE 0.9 % (FLUSH) 0.9 %
5-40 SYRINGE (ML) INJECTION EVERY 12 HOURS SCHEDULED
Status: DISCONTINUED | OUTPATIENT
Start: 2023-05-08 | End: 2023-05-09 | Stop reason: HOSPADM

## 2023-05-08 RX ORDER — FENTANYL CITRATE 50 UG/ML
INJECTION, SOLUTION INTRAMUSCULAR; INTRAVENOUS PRN
Status: DISCONTINUED | OUTPATIENT
Start: 2023-05-08 | End: 2023-05-08 | Stop reason: SDUPTHER

## 2023-05-08 RX ORDER — SODIUM CHLORIDE 9 MG/ML
INJECTION, SOLUTION INTRAVENOUS CONTINUOUS
Status: DISCONTINUED | OUTPATIENT
Start: 2023-05-08 | End: 2023-05-09 | Stop reason: HOSPADM

## 2023-05-08 RX ORDER — SODIUM CHLORIDE, SODIUM LACTATE, POTASSIUM CHLORIDE, CALCIUM CHLORIDE 600; 310; 30; 20 MG/100ML; MG/100ML; MG/100ML; MG/100ML
INJECTION, SOLUTION INTRAVENOUS CONTINUOUS
Status: DISCONTINUED | OUTPATIENT
Start: 2023-05-08 | End: 2023-05-08 | Stop reason: HOSPADM

## 2023-05-08 RX ORDER — DIPHENHYDRAMINE HCL 25 MG
25 CAPSULE ORAL EVERY 6 HOURS PRN
Status: DISCONTINUED | OUTPATIENT
Start: 2023-05-08 | End: 2023-05-09 | Stop reason: HOSPADM

## 2023-05-08 RX ORDER — SODIUM CHLORIDE 0.9 % (FLUSH) 0.9 %
5-40 SYRINGE (ML) INJECTION PRN
Status: DISCONTINUED | OUTPATIENT
Start: 2023-05-08 | End: 2023-05-08 | Stop reason: HOSPADM

## 2023-05-08 RX ORDER — VANCOMYCIN HYDROCHLORIDE 1 G/20ML
INJECTION, POWDER, LYOPHILIZED, FOR SOLUTION INTRAVENOUS PRN
Status: DISCONTINUED | OUTPATIENT
Start: 2023-05-08 | End: 2023-05-08 | Stop reason: ALTCHOICE

## 2023-05-08 RX ORDER — DEXAMETHASONE SODIUM PHOSPHATE 4 MG/ML
INJECTION, SOLUTION INTRA-ARTICULAR; INTRALESIONAL; INTRAMUSCULAR; INTRAVENOUS; SOFT TISSUE PRN
Status: DISCONTINUED | OUTPATIENT
Start: 2023-05-08 | End: 2023-05-08 | Stop reason: SDUPTHER

## 2023-05-08 RX ORDER — PROPOFOL 10 MG/ML
INJECTION, EMULSION INTRAVENOUS PRN
Status: DISCONTINUED | OUTPATIENT
Start: 2023-05-08 | End: 2023-05-08 | Stop reason: SDUPTHER

## 2023-05-08 RX ORDER — SIMETHICONE 80 MG
120 TABLET,CHEWABLE ORAL 4 TIMES DAILY PRN
Status: DISCONTINUED | OUTPATIENT
Start: 2023-05-08 | End: 2023-05-09 | Stop reason: HOSPADM

## 2023-05-08 RX ORDER — ROPIVACAINE HYDROCHLORIDE 5 MG/ML
INJECTION, SOLUTION EPIDURAL; INFILTRATION; PERINEURAL PRN
Status: DISCONTINUED | OUTPATIENT
Start: 2023-05-08 | End: 2023-05-08 | Stop reason: ALTCHOICE

## 2023-05-08 RX ORDER — LIDOCAINE HYDROCHLORIDE 20 MG/ML
INJECTION, SOLUTION EPIDURAL; INFILTRATION; INTRACAUDAL; PERINEURAL PRN
Status: DISCONTINUED | OUTPATIENT
Start: 2023-05-08 | End: 2023-05-08 | Stop reason: SDUPTHER

## 2023-05-08 RX ORDER — OXYCODONE AND ACETAMINOPHEN 7.5; 325 MG/1; MG/1
1 TABLET ORAL EVERY 4 HOURS PRN
Status: DISCONTINUED | OUTPATIENT
Start: 2023-05-08 | End: 2023-05-09 | Stop reason: HOSPADM

## 2023-05-08 RX ORDER — APREPITANT 40 MG/1
40 CAPSULE ORAL ONCE
Status: COMPLETED | OUTPATIENT
Start: 2023-05-08 | End: 2023-05-08

## 2023-05-08 RX ORDER — LIDOCAINE HYDROCHLORIDE 10 MG/ML
1 INJECTION, SOLUTION INFILTRATION; PERINEURAL
Status: DISCONTINUED | OUTPATIENT
Start: 2023-05-08 | End: 2023-05-08 | Stop reason: HOSPADM

## 2023-05-08 RX ORDER — HYDRALAZINE HYDROCHLORIDE 20 MG/ML
INJECTION INTRAMUSCULAR; INTRAVENOUS PRN
Status: DISCONTINUED | OUTPATIENT
Start: 2023-05-08 | End: 2023-05-08 | Stop reason: SDUPTHER

## 2023-05-08 RX ORDER — OXYCODONE AND ACETAMINOPHEN 7.5; 325 MG/1; MG/1
2 TABLET ORAL EVERY 4 HOURS PRN
Status: DISCONTINUED | OUTPATIENT
Start: 2023-05-08 | End: 2023-05-09 | Stop reason: HOSPADM

## 2023-05-08 RX ORDER — PRAMIPEXOLE DIHYDROCHLORIDE 0.25 MG/1
0.5 TABLET ORAL
Status: DISCONTINUED | OUTPATIENT
Start: 2023-05-08 | End: 2023-05-09 | Stop reason: HOSPADM

## 2023-05-08 RX ORDER — RASAGILINE 1 MG/1
1 TABLET ORAL DAILY
Status: DISCONTINUED | OUTPATIENT
Start: 2023-05-08 | End: 2023-05-09 | Stop reason: HOSPADM

## 2023-05-08 RX ORDER — IPRATROPIUM BROMIDE AND ALBUTEROL SULFATE 2.5; .5 MG/3ML; MG/3ML
1 SOLUTION RESPIRATORY (INHALATION)
Status: DISCONTINUED | OUTPATIENT
Start: 2023-05-08 | End: 2023-05-08 | Stop reason: HOSPADM

## 2023-05-08 RX ORDER — SODIUM CHLORIDE 9 MG/ML
INJECTION, SOLUTION INTRAVENOUS PRN
Status: DISCONTINUED | OUTPATIENT
Start: 2023-05-08 | End: 2023-05-09 | Stop reason: HOSPADM

## 2023-05-08 RX ORDER — TRANEXAMIC ACID 100 MG/ML
INJECTION, SOLUTION INTRAVENOUS PRN
Status: DISCONTINUED | OUTPATIENT
Start: 2023-05-08 | End: 2023-05-08 | Stop reason: SDUPTHER

## 2023-05-08 RX ORDER — OXYCODONE HYDROCHLORIDE 5 MG/1
5 TABLET ORAL
Status: DISCONTINUED | OUTPATIENT
Start: 2023-05-08 | End: 2023-05-08 | Stop reason: HOSPADM

## 2023-05-08 RX ORDER — SODIUM CHLORIDE 0.9 % (FLUSH) 0.9 %
5-40 SYRINGE (ML) INJECTION EVERY 12 HOURS SCHEDULED
Status: DISCONTINUED | OUTPATIENT
Start: 2023-05-08 | End: 2023-05-08 | Stop reason: HOSPADM

## 2023-05-08 RX ORDER — TRAMADOL HYDROCHLORIDE 50 MG/1
50 TABLET ORAL DAILY
Status: DISCONTINUED | OUTPATIENT
Start: 2023-05-08 | End: 2023-05-09 | Stop reason: HOSPADM

## 2023-05-08 RX ORDER — ONDANSETRON 2 MG/ML
INJECTION INTRAMUSCULAR; INTRAVENOUS PRN
Status: DISCONTINUED | OUTPATIENT
Start: 2023-05-08 | End: 2023-05-08 | Stop reason: SDUPTHER

## 2023-05-08 RX ORDER — MIDAZOLAM HYDROCHLORIDE 2 MG/2ML
2 INJECTION, SOLUTION INTRAMUSCULAR; INTRAVENOUS
Status: COMPLETED | OUTPATIENT
Start: 2023-05-08 | End: 2023-05-08

## 2023-05-08 RX ORDER — FENTANYL CITRATE 50 UG/ML
50 INJECTION, SOLUTION INTRAMUSCULAR; INTRAVENOUS EVERY 5 MIN PRN
Status: DISCONTINUED | OUTPATIENT
Start: 2023-05-08 | End: 2023-05-08 | Stop reason: HOSPADM

## 2023-05-08 RX ORDER — HYDROMORPHONE HYDROCHLORIDE 2 MG/ML
0.5 INJECTION, SOLUTION INTRAMUSCULAR; INTRAVENOUS; SUBCUTANEOUS EVERY 10 MIN PRN
Status: DISCONTINUED | OUTPATIENT
Start: 2023-05-08 | End: 2023-05-08 | Stop reason: HOSPADM

## 2023-05-08 RX ORDER — TRANEXAMIC ACID 100 MG/ML
INJECTION, SOLUTION INTRAVENOUS PRN
Status: DISCONTINUED | OUTPATIENT
Start: 2023-05-08 | End: 2023-05-08 | Stop reason: ALTCHOICE

## 2023-05-08 RX ORDER — ONDANSETRON 2 MG/ML
4 INJECTION INTRAMUSCULAR; INTRAVENOUS EVERY 6 HOURS PRN
Status: DISCONTINUED | OUTPATIENT
Start: 2023-05-08 | End: 2023-05-09 | Stop reason: HOSPADM

## 2023-05-08 RX ORDER — ONDANSETRON 4 MG/1
4 TABLET, ORALLY DISINTEGRATING ORAL EVERY 8 HOURS PRN
Status: DISCONTINUED | OUTPATIENT
Start: 2023-05-08 | End: 2023-05-09 | Stop reason: HOSPADM

## 2023-05-08 RX ORDER — ESCITALOPRAM OXALATE 10 MG/1
20 TABLET ORAL DAILY
Status: DISCONTINUED | OUTPATIENT
Start: 2023-05-08 | End: 2023-05-09 | Stop reason: HOSPADM

## 2023-05-08 RX ORDER — SODIUM CHLORIDE 9 MG/ML
INJECTION, SOLUTION INTRAVENOUS PRN
Status: DISCONTINUED | OUTPATIENT
Start: 2023-05-08 | End: 2023-05-08 | Stop reason: HOSPADM

## 2023-05-08 RX ORDER — DIPHENHYDRAMINE HYDROCHLORIDE 50 MG/ML
25 INJECTION INTRAMUSCULAR; INTRAVENOUS EVERY 6 HOURS PRN
Status: DISCONTINUED | OUTPATIENT
Start: 2023-05-08 | End: 2023-05-09 | Stop reason: HOSPADM

## 2023-05-08 RX ORDER — CHOLECALCIFEROL (VITAMIN D3) 125 MCG
1 CAPSULE ORAL
Status: DISCONTINUED | OUTPATIENT
Start: 2023-05-08 | End: 2023-05-09 | Stop reason: HOSPADM

## 2023-05-08 RX ORDER — SUMATRIPTAN 50 MG/1
50 TABLET, FILM COATED ORAL
Status: DISCONTINUED | OUTPATIENT
Start: 2023-05-08 | End: 2023-05-08

## 2023-05-08 RX ORDER — ACETAMINOPHEN 500 MG
1000 TABLET ORAL ONCE
Status: DISCONTINUED | OUTPATIENT
Start: 2023-05-08 | End: 2023-05-08 | Stop reason: HOSPADM

## 2023-05-08 RX ORDER — ONDANSETRON 2 MG/ML
4 INJECTION INTRAMUSCULAR; INTRAVENOUS
Status: DISCONTINUED | OUTPATIENT
Start: 2023-05-08 | End: 2023-05-08 | Stop reason: HOSPADM

## 2023-05-08 RX ORDER — CARBIDOPA AND LEVODOPA 25; 100 MG/1; MG/1
2 TABLET, EXTENDED RELEASE ORAL NIGHTLY
Status: DISCONTINUED | OUTPATIENT
Start: 2023-05-08 | End: 2023-05-09 | Stop reason: HOSPADM

## 2023-05-08 RX ORDER — HALOPERIDOL 5 MG/ML
1 INJECTION INTRAMUSCULAR
Status: DISCONTINUED | OUTPATIENT
Start: 2023-05-08 | End: 2023-05-08 | Stop reason: HOSPADM

## 2023-05-08 RX ORDER — SODIUM CHLORIDE 0.9 % (FLUSH) 0.9 %
5-40 SYRINGE (ML) INJECTION PRN
Status: DISCONTINUED | OUTPATIENT
Start: 2023-05-08 | End: 2023-05-09 | Stop reason: HOSPADM

## 2023-05-08 RX ORDER — BUPIVACAINE HYDROCHLORIDE 5 MG/ML
INJECTION, SOLUTION EPIDURAL; INTRACAUDAL
Status: COMPLETED | OUTPATIENT
Start: 2023-05-08 | End: 2023-05-08

## 2023-05-08 RX ORDER — ROCURONIUM BROMIDE 10 MG/ML
INJECTION, SOLUTION INTRAVENOUS PRN
Status: DISCONTINUED | OUTPATIENT
Start: 2023-05-08 | End: 2023-05-08 | Stop reason: SDUPTHER

## 2023-05-08 RX ORDER — EPINEPHRINE 1 MG/ML(1)
AMPUL (ML) INJECTION PRN
Status: DISCONTINUED | OUTPATIENT
Start: 2023-05-08 | End: 2023-05-08 | Stop reason: ALTCHOICE

## 2023-05-08 RX ADMIN — Medication 3 AMPULE: at 11:31

## 2023-05-08 RX ADMIN — PROPOFOL 20 MG: 10 INJECTION, EMULSION INTRAVENOUS at 16:14

## 2023-05-08 RX ADMIN — ONDANSETRON 4 MG: 2 INJECTION INTRAMUSCULAR; INTRAVENOUS at 15:00

## 2023-05-08 RX ADMIN — CARBIDOPA AND LEVODOPA 2 TABLET: 25; 100 TABLET, EXTENDED RELEASE ORAL at 21:15

## 2023-05-08 RX ADMIN — FENTANYL CITRATE 25 MCG: 50 INJECTION, SOLUTION INTRAMUSCULAR; INTRAVENOUS at 16:18

## 2023-05-08 RX ADMIN — ASPIRIN 325 MG: 325 TABLET, COATED ORAL at 21:15

## 2023-05-08 RX ADMIN — SODIUM CHLORIDE: 9 INJECTION, SOLUTION INTRAVENOUS at 19:45

## 2023-05-08 RX ADMIN — CARBIDOPA AND LEVODOPA 1 TABLET: 25; 100 TABLET ORAL at 17:19

## 2023-05-08 RX ADMIN — ROCURONIUM BROMIDE 10 MG: 50 INJECTION, SOLUTION INTRAVENOUS at 16:14

## 2023-05-08 RX ADMIN — DEXAMETHASONE SODIUM PHOSPHATE 5 MG: 4 INJECTION, SOLUTION INTRAMUSCULAR; INTRAVENOUS at 13:36

## 2023-05-08 RX ADMIN — PROPOFOL 150 MG: 10 INJECTION, EMULSION INTRAVENOUS at 14:20

## 2023-05-08 RX ADMIN — LIDOCAINE HYDROCHLORIDE 80 MG: 20 INJECTION, SOLUTION EPIDURAL; INFILTRATION; INTRACAUDAL; PERINEURAL at 14:20

## 2023-05-08 RX ADMIN — HYDRALAZINE HYDROCHLORIDE 10 MG: 20 INJECTION INTRAMUSCULAR; INTRAVENOUS at 16:53

## 2023-05-08 RX ADMIN — SODIUM CHLORIDE, POTASSIUM CHLORIDE, SODIUM LACTATE AND CALCIUM CHLORIDE: 600; 310; 30; 20 INJECTION, SOLUTION INTRAVENOUS at 11:32

## 2023-05-08 RX ADMIN — CEFAZOLIN SODIUM 2000 MG: 100 INJECTION, POWDER, LYOPHILIZED, FOR SOLUTION INTRAVENOUS at 22:22

## 2023-05-08 RX ADMIN — SODIUM CHLORIDE, PRESERVATIVE FREE 5 ML: 5 INJECTION INTRAVENOUS at 21:35

## 2023-05-08 RX ADMIN — BUPIVACAINE HYDROCHLORIDE 15 ML: 5 INJECTION, SOLUTION EPIDURAL; INTRACAUDAL; PERINEURAL at 13:36

## 2023-05-08 RX ADMIN — TRANEXAMIC ACID 1000 MG: 100 INJECTION, SOLUTION INTRAVENOUS at 14:35

## 2023-05-08 RX ADMIN — TRANEXAMIC ACID 1000 MG: 100 INJECTION, SOLUTION INTRAVENOUS at 15:39

## 2023-05-08 RX ADMIN — ROCURONIUM BROMIDE 40 MG: 50 INJECTION, SOLUTION INTRAVENOUS at 14:20

## 2023-05-08 RX ADMIN — CARBIDOPA AND LEVODOPA 1 TABLET: 25; 100 TABLET ORAL at 21:15

## 2023-05-08 RX ADMIN — MIDAZOLAM 2 MG: 1 INJECTION INTRAMUSCULAR; INTRAVENOUS at 13:36

## 2023-05-08 RX ADMIN — PRAMIPEXOLE DIHYDROCHLORIDE 0.5 MG: 0.25 TABLET ORAL at 22:22

## 2023-05-08 RX ADMIN — TRAMADOL HYDROCHLORIDE 50 MG: 50 TABLET, COATED ORAL at 19:45

## 2023-05-08 RX ADMIN — APREPITANT 40 MG: 40 CAPSULE ORAL at 11:23

## 2023-05-08 RX ADMIN — DEXAMETHASONE SODIUM PHOSPHATE 4 MG: 4 INJECTION, SOLUTION INTRAMUSCULAR; INTRAVENOUS at 15:00

## 2023-05-08 RX ADMIN — Medication 2 G: at 14:35

## 2023-05-08 RX ADMIN — FENTANYL CITRATE 75 MCG: 50 INJECTION, SOLUTION INTRAMUSCULAR; INTRAVENOUS at 14:20

## 2023-05-08 ASSESSMENT — PAIN - FUNCTIONAL ASSESSMENT: PAIN_FUNCTIONAL_ASSESSMENT: 0-10

## 2023-05-08 ASSESSMENT — PAIN SCALES - GENERAL
PAINLEVEL_OUTOF10: 0

## 2023-05-08 NOTE — PERIOP NOTE
MD Deandra ordered pt to get normal Sinemet dose ASAP upon arrival to PACU. Ordered and spoke with pharmacist to tube to [phone].

## 2023-05-08 NOTE — PROGRESS NOTES
TRANSFER - IN REPORT:    Verbal report received from TED Rubio on Radha Jaime  being received from OP PACU for routine progression of patient care      Report consisted of patient's Situation, Background, Assessment and   Recommendations(SBAR). Information from the following report(s) Nurse Handoff Report was reviewed with the receiving nurse. Opportunity for questions and clarification was provided. Assessment completed upon patient's arrival to unit and care assumed.

## 2023-05-08 NOTE — ANESTHESIA PRE PROCEDURE
Tobacco Use    Smoking status: Never    Smokeless tobacco: Never   Substance Use Topics    Alcohol use: Yes     Comment: 2-3 per week                                Counseling given: Not Answered      Vital Signs (Current):   Vitals:    05/08/23 1242 05/08/23 1342 05/08/23 1347 05/08/23 1352   BP:  136/61 (!) 142/67 (!) 146/68   Pulse:  61 63 62   Resp:  16 16 16   Temp:       TempSrc:       SpO2:  98% 98% 98%   Weight: 150 lb (68 kg)      Height: 5' 7\" (1.702 m)                                                 BP Readings from Last 3 Encounters:   05/08/23 (!) 146/68   04/28/23 122/80   04/24/23 138/77       NPO Status: Time of last liquid consumption: 1800                        Time of last solid consumption: 1800                        Date of last liquid consumption: 05/07/23                        Date of last solid food consumption: 05/07/23    BMI:   Wt Readings from Last 3 Encounters:   05/08/23 150 lb (68 kg)   04/28/23 152 lb (68.9 kg)   04/24/23 151 lb 14.4 oz (68.9 kg)     Body mass index is 23.49 kg/m². CBC:   Lab Results   Component Value Date/Time    WBC 6.2 04/24/2023 01:48 PM    RBC 3.91 04/24/2023 01:48 PM    HGB 10.6 04/24/2023 01:48 PM    HCT 33.8 04/24/2023 01:48 PM    MCV 86.4 04/24/2023 01:48 PM    RDW 16.7 04/24/2023 01:48 PM     04/24/2023 01:48 PM       CMP:   Lab Results   Component Value Date/Time     04/24/2023 01:48 PM    K 3.9 04/24/2023 01:48 PM     04/24/2023 01:48 PM    CO2 28 04/24/2023 01:48 PM    BUN 15 04/24/2023 01:48 PM    CREATININE 0.51 04/24/2023 01:48 PM    LABGLOM >60 04/24/2023 01:48 PM    GLUCOSE 98 04/24/2023 01:48 PM    CALCIUM 9.5 04/24/2023 01:48 PM       POC Tests: No results for input(s): POCGLU, POCNA, POCK, POCCL, POCBUN, POCHEMO, POCHCT in the last 72 hours.     Coags: No results found for: PROTIME, INR, APTT    HCG (If Applicable): No results found for: PREGTESTUR, PREGSERUM, HCG, HCGQUANT     ABGs: No results found for: PHART,

## 2023-05-08 NOTE — PERIOP NOTE
TRANSFER - OUT REPORT:    Verbal report given to Sharon Farley Rn on [place] Slot  being transferred to  for routine post-op       Report consisted of patient's Situation, Background, Assessment and   Recommendations(SBAR). Information from the following report(s) Nurse Handoff Report, Adult Overview, Surgery Report, Intake/Output, MAR, and Recent Results was reviewed with the receiving nurse. [place] Assessment: No data recorded  Lines:   Peripheral IV 05/08/23 Left Hand (Active)   Site Assessment Clean, dry & intact 05/08/23 1700   Line Status Infusing 05/08/23 1700   Phlebitis Assessment No symptoms 05/08/23 1700   Infiltration Assessment 0 05/08/23 1700   Alcohol Cap Used No 05/08/23 1700   Dressing Status Clean, dry & intact 05/08/23 1700   Dressing Type Transparent 05/08/23 1700   Dressing Intervention New 05/08/23 1244        Opportunity for questions and clarification was provided.       Patient transported with:  O2 @ 2lpm

## 2023-05-08 NOTE — ANESTHESIA PROCEDURE NOTES
Airway  Date/Time: 5/8/2023 2:20 PM  Urgency: elective    Airway not difficult    General Information and Staff    Patient location during procedure: OR  Anesthesiologist: Jamila Lozano MD  Resident/CRNA: NISHA Escamilla CRNA  Performed: resident/CRNA     Indications and Patient Condition  Indications for airway management: anesthesia  Spontaneous ventilation: present  Sedation level: deep  Preoxygenated: yes  Patient position: sniffing  MILS not maintained throughout  Mask difficulty assessment: vent by bag mask    Final Airway Details  Final airway type: endotracheal airway      Successful airway: ETT  Cuffed: yes   Successful intubation technique: direct laryngoscopy  Facilitating devices/methods: intubating stylet  Endotracheal tube insertion site: oral  Blade: Hemalatha  Blade size: #4  ETT size (mm): 7.0  Cormack-Lehane Classification: grade I - full view of glottis  Placement verified by: chest auscultation and capnometry   Inital cuff pressure (cm H2O): 7  Measured from: teeth  ETT to teeth (cm): 23  Number of attempts at approach: 1  Ventilation between attempts: bag mask  Number of other approaches attempted: 0    no

## 2023-05-08 NOTE — ANESTHESIA POSTPROCEDURE EVALUATION
Department of Anesthesiology  Postprocedure Note    Patient: Stepan Yanez  MRN: [id number]  YOB: 1945  Date of evaluation: 5/8/2023      Procedure Summary     Date: 05/08/23 Room / Location: [facility] OP OR 02 / [facility]    Anesthesia Start: 1408 Anesthesia Stop:     Procedure: RIGHT SHOULDER TOTAL ARTHROPLASTY REVERSE -REGIONAL BLOCK -23 HOUR OBSERVATION (Right: Shoulder) Diagnosis:       Primary osteoarthritis of right shoulder      History of osteoporosis      Right rotator cuff tear arthropathy      (Primary osteoarthritis of right shoulder [M19.011])      (History of osteoporosis [Z87.39])      (Right rotator cuff tear arthropathy [M75.101, M12.811])    Surgeons: Raiza Hills MD Responsible Provider: Marlyne Sandifer, MD    Anesthesia Type: general ASA Status: 3          Anesthesia Type: No value filed.     Raleigh Phase I: Raleigh Score: 8    Raleigh Phase II:        Anesthesia Post Evaluation    Patient location during evaluation: PACU  Patient participation: complete - patient participated  Level of consciousness: awake and alert  Airway patency: patent  Nausea & Vomiting: no nausea and no vomiting  Complications: no  Cardiovascular status: hemodynamically stable  Respiratory status: acceptable, nonlabored ventilation and spontaneous ventilation  Hydration status: euvolemic  Comments: BP (!) 106/56   Pulse 62   Temp 97.2 °F (36.2 °C) (Temporal)   Resp 15   Ht 5' 7\" (1.702 m)   Wt 150 lb (68 kg)   SpO2 100%   BMI 23.49 kg/m²     Multimodal analgesia pain management approach

## 2023-05-08 NOTE — ANESTHESIA PROCEDURE NOTES
Peripheral Block    Patient location during procedure: pre-op  Reason for block: post-op pain management and at surgeon's request  Start time: 5/8/2023 1:36 PM  End time: 5/8/2023 1:42 PM  Staffing  Performed: anesthesiologist   Anesthesiologist: Carlos Hutchinson MD  Preanesthetic Checklist  Completed: patient identified, IV checked, site marked, risks and benefits discussed, surgical/procedural consents, equipment checked, pre-op evaluation, timeout performed, anesthesia consent given, oxygen available, monitors applied/VS acknowledged and blood product R/B/A discussed and consented  Peripheral Block   Patient position: sitting  Prep: ChloraPrep  Provider prep: sterile gloves  Patient monitoring: cardiac monitor, continuous pulse ox, frequent blood pressure checks, IV access, oxygen and responsive to questions  Block type: Brachial plexus  Interscalene  Laterality: right  Injection technique: single-shot  Guidance: ultrasound guided  Local infiltration: lidocaine  Infiltration strength: 1 %  Local infiltration: lidocaine  Dose: 3 mL    Needle   Needle type: insulated echogenic nerve stimulator needle   Needle gauge: 20 G  Needle localization: ultrasound guidance  Needle length: 5 cm  Assessment   Injection assessment: negative aspiration for heme, no paresthesia on injection, local visualized surrounding nerve on ultrasound and no intravascular symptoms  Paresthesia pain: none  Slow fractionated injection: yes  Hemodynamics: stable  Real-time US image taken/store: yes  Outcomes: uncomplicated    Additional Notes  Risks and benefits of block discussed prior to sedation including the most likely scenario of complete block resolution within 24h, expected ipselateral diaphragm weakness, small risk of erasto's syndrome, low chance of intravascular injection, low chance of damage to surrounding structures, unlikely ,but possible  risk of unexpected tingling, numbness or weakness persisting longer than 24h.  And the

## 2023-05-09 VITALS
TEMPERATURE: 97.5 F | HEART RATE: 76 BPM | WEIGHT: 150 LBS | BODY MASS INDEX: 23.54 KG/M2 | SYSTOLIC BLOOD PRESSURE: 126 MMHG | RESPIRATION RATE: 18 BRPM | OXYGEN SATURATION: 90 % | DIASTOLIC BLOOD PRESSURE: 78 MMHG | HEIGHT: 67 IN

## 2023-05-09 LAB
HCT VFR BLD AUTO: 32.4 % (ref 35.8–46.3)
HGB BLD-MCNC: 10 G/DL (ref 11.7–15.4)

## 2023-05-09 PROCEDURE — 97165 OT EVAL LOW COMPLEX 30 MIN: CPT

## 2023-05-09 PROCEDURE — 6370000000 HC RX 637 (ALT 250 FOR IP): Performed by: ORTHOPAEDIC SURGERY

## 2023-05-09 PROCEDURE — 97530 THERAPEUTIC ACTIVITIES: CPT

## 2023-05-09 PROCEDURE — 36415 COLL VENOUS BLD VENIPUNCTURE: CPT

## 2023-05-09 PROCEDURE — 97110 THERAPEUTIC EXERCISES: CPT

## 2023-05-09 PROCEDURE — 85014 HEMATOCRIT: CPT

## 2023-05-09 PROCEDURE — 2580000003 HC RX 258: Performed by: PHYSICIAN ASSISTANT

## 2023-05-09 PROCEDURE — 6370000000 HC RX 637 (ALT 250 FOR IP): Performed by: PHYSICIAN ASSISTANT

## 2023-05-09 PROCEDURE — 6360000002 HC RX W HCPCS: Performed by: PHYSICIAN ASSISTANT

## 2023-05-09 PROCEDURE — 97161 PT EVAL LOW COMPLEX 20 MIN: CPT

## 2023-05-09 PROCEDURE — 85018 HEMOGLOBIN: CPT

## 2023-05-09 PROCEDURE — 97112 NEUROMUSCULAR REEDUCATION: CPT

## 2023-05-09 PROCEDURE — 97535 SELF CARE MNGMENT TRAINING: CPT

## 2023-05-09 RX ADMIN — ONDANSETRON 4 MG: 4 TABLET, ORALLY DISINTEGRATING ORAL at 09:04

## 2023-05-09 RX ADMIN — NALOXEGOL OXALATE 25 MG: 25 TABLET, FILM COATED ORAL at 08:06

## 2023-05-09 RX ADMIN — ASPIRIN 325 MG: 325 TABLET, COATED ORAL at 08:05

## 2023-05-09 RX ADMIN — TRAMADOL HYDROCHLORIDE 50 MG: 50 TABLET, COATED ORAL at 08:06

## 2023-05-09 RX ADMIN — SODIUM CHLORIDE, PRESERVATIVE FREE 10 ML: 5 INJECTION INTRAVENOUS at 08:15

## 2023-05-09 RX ADMIN — ESCITALOPRAM OXALATE 20 MG: 10 TABLET ORAL at 08:06

## 2023-05-09 RX ADMIN — OXYCODONE AND ACETAMINOPHEN 1 TABLET: 7.5; 325 TABLET ORAL at 09:02

## 2023-05-09 RX ADMIN — CEFAZOLIN SODIUM 2000 MG: 100 INJECTION, POWDER, LYOPHILIZED, FOR SOLUTION INTRAVENOUS at 05:45

## 2023-05-09 ASSESSMENT — PAIN DESCRIPTION - DESCRIPTORS
DESCRIPTORS: SORE
DESCRIPTORS: ACHING

## 2023-05-09 ASSESSMENT — PAIN DESCRIPTION - ORIENTATION
ORIENTATION: RIGHT
ORIENTATION: RIGHT

## 2023-05-09 ASSESSMENT — PAIN DESCRIPTION - FREQUENCY: FREQUENCY: INTERMITTENT

## 2023-05-09 ASSESSMENT — PAIN SCALES - GENERAL
PAINLEVEL_OUTOF10: 4
PAINLEVEL_OUTOF10: 0
PAINLEVEL_OUTOF10: 4

## 2023-05-09 ASSESSMENT — PAIN DESCRIPTION - LOCATION: LOCATION: SHOULDER

## 2023-05-09 ASSESSMENT — PAIN DESCRIPTION - ONSET: ONSET: SUDDEN

## 2023-05-09 ASSESSMENT — PAIN DESCRIPTION - PAIN TYPE: TYPE: SURGICAL PAIN

## 2023-05-09 NOTE — THERAPY EVALUATION
level, Able to Live on Main level with bedroom/bathroom  Home Access: Stairs to enter with rails  Entrance Stairs - Number of Steps: 4  Bathroom Shower/Tub: Walk-in shower  Home Equipment: Nini Beverage  Has the patient had two or more falls in the past year or any fall with injury in the past year?: No  Receives Help From: Family  ADL Assistance: [address]: Independent  Ambulation Assistance: Independent  Transfer Assistance: Independent    OBJECTIVE:     PAIN: [place] [facility] / O2: PRECAUTION / [facility] / Efraín Erick:   Pre Treatment: 4/10 RUE         Post Treatment: 4/10 RUE Vitals        Oxygen      None    RESTRICTIONS/PRECAUTIONS:  Required Braces or Orthoses?: Yes           Right Upper Extremity Brace/Splint: Sling (Ultrasling; Reverse Total Shoulder Protocol)     GROSS EVALUATION: BLE Intact Impaired (Comments):   AROM [x]     PROM [x]    Strength [x]     Balance []   Good sitting, fair standing   Posture [] N/A   Sensation [x]     Coordination [x]      Tone []     Edema []    Activity Tolerance [x]      []      COGNITION/  PERCEPTION: Intact Impaired (Comments):   Orientation [x]     Vision [x]     Hearing [x]     Cognition  [x]       MOBILITY: I Mod I S SBA CGA Min Mod Max Total  NT x2 Comments:   Bed Mobility    Rolling [] [] [] [] [x] [] [] [] [] [] []    Supine to Sit [] [] [] [] [x] [] [] [] [] [] []    Scooting [] [] [] [] [x] [] [] [] [] [] []    Sit to Supine [] [] [] [] [] [] [] [] [] [] []    Transfers    Sit to Stand [] [] [] [] [x] [x] [] [] [] [] []    Bed to Chair [] [] [] [] [] [x] [] [] [] [] []    Stand to Sit [] [] [] [] [x] [x] [] [] [] [] []     [] [] [] [] [] [] [] [] [] [] []    I=Independent, Mod I=Modified Independent, S=Supervision, SBA=Standby Assistance, CGA=Contact Guard Assistance,   Min=Minimal Assistance, Mod=Moderate Assistance, Max=Maximal Assistance, Total=Total Assistance, NT=Not Tested    GAIT: I Mod I S SBA CGA Min Mod Max Total  NT x2 Comments:   Level of Assistance []

## 2023-05-09 NOTE — PROGRESS NOTES
May 9, 2023         Post Op day: 1 Day Post-Op     Admit Date: 2023  Admit Diagnosis: Primary osteoarthritis of right shoulder [M19.011]  History of osteoporosis [Z87.39]  Right rotator cuff tear arthropathy [M75.101, M12.811]  Arthritis of right shoulder region [M19.011]  No surgery found  No surgery found    Subjective: Doing well, No complaints, No SOB, No Chest Pain, No Nausea or Vomitting. Notes Parkinson's is acting up  PT/OT:                             Weight Bearing Status: NWB in sling  BMP:  No results for input(s): CREA, BUN, NA, K, CL, CO2, AGAP, GLU in the last 72 hours. Patient Vitals for the past 8 hrs:   BP Temp Temp src Pulse Resp SpO2   23 0712 126/78 97.5 °F (36.4 °C) Oral 76 18 90 %   23 0311 131/76 97.7 °F (36.5 °C) Oral 66 18 93 %     Temp (24hrs), Av.7 °F (36.5 °C), Min:97.2 °F (36.2 °C), Max:98.4 °F (36.9 °C)    CBC:  Recent Labs     23  0401   HGB 10.0*   HCT 32.4*       Microbiology:     Recent Results (from the past 72 hour(s))   TYPE AND SCREEN    Collection Time: 23 11:28 AM   Result Value Ref Range    Crossmatch expiration date 2023,2359     ABO/Rh A POSITIVE     Antibody Screen NEG    Hemoglobin and Hematocrit    Collection Time: 23  4:01 AM   Result Value Ref Range    Hemoglobin 10.0 (L) 11.7 - 15.4 g/dL    Hematocrit 32.4 (L) 35.8 - 46.3 %       Objective: Vital Signs are Stable, No Acute Distress, Alert and Oriented  Dressing is Dry,   Patient has distal motor function intact. Decreased sensation lateral shoulder.   Otherwise neurovascular exam is normal    Assessment:  Patient Active Problem List   Diagnosis    Anxiety    Depression with anxiety    Restless legs syndrome    Hypophonia    RBD (REM behavioral disorder)    Depression    Insomnia    Cervical stenosis of spine    Tremor    Migraine without status migrainosus, not intractable    Parkinson's disease ([facility] [address]    History of osteoporosis    Osteoporosis    Left arm pain

## 2023-05-09 NOTE — PLAN OF CARE
Problem: Pain  Goal: Verbalizes/displays adequate comfort level or baseline comfort level  Outcome: Progressing     Problem: Discharge Planning  Goal: Discharge to home or other facility with appropriate resources  Outcome: Progressing  Flowsheets (Taken 5/8/2023 1929)  Discharge to home or other facility with appropriate resources: Identify barriers to discharge with patient and caregiver     Problem: Safety - Adult  Goal: Free from fall injury  Outcome: Progressing  Flowsheets (Taken 5/8/2023 1929)  Free From Fall Injury:   Instruct family/caregiver on patient safety   Based on caregiver fall risk screen, instruct family/caregiver to ask for assistance with transferring infant if caregiver noted to have fall risk factors

## 2023-05-09 NOTE — PROGRESS NOTES
ACUTE OCCUPATIONAL THERAPY GOALS:   (Developed with and agreed upon by patient and/or caregiver.)  1. Patient will complete lower body bathing and dressing with Mod I and adaptive equipment as   needed. 2. Patient will completed upper body bathing and dressing with Mod I and adaptive equipment as needed. 3. Patient will complete grooming standing at sink with Mod I and adaptive equipment as needed. 4. Patient will complete toileting with Mod I and adaptive equipment as needed. 5. Patient will tolerate 30 minutes of OT treatment with no rest breaks to increase activity tolerance for ADLs. 6. Patient will complete functional transfers with Mod I and adaptive equipment as needed. Timeframe: 7 visits         OCCUPATIONAL THERAPY Initial Assessment, Daily Note, and AM       OT Visit Days: 1  Acknowledge Orders  Time  OT Charge Capture  Rehab Caseload Tracker      Dorie Lion is a 77 y.o. female   PRIMARY DIAGNOSIS: Arthritis of right shoulder region  Primary osteoarthritis of right shoulder [M19.011]  History of osteoporosis [Z87.39]  Right rotator cuff tear arthropathy [M75.101, M12.811]  Arthritis of right shoulder region [M19.011]  Procedure(s) (LRB):  RIGHT SHOULDER TOTAL ARTHROPLASTY REVERSE -REGIONAL BLOCK -23 HOUR OBSERVATION (Right)  1 Day Post-Op  Reason for Referral: Generalized Muscle Weakness (M62.81)  Other abnormalities of gait and mobility (R26.89)  Outpatient in a bed: Payor: MEDICARE / Plan: MEDICARE PART A AND B / Product Type: *No Product type* /     ASSESSMENT:     REHAB RECOMMENDATIONS:   Recommendation to date pending progress:  Setting:  Outpatient Therapy    Equipment:    To Be Determined     ASSESSMENT:  Ms. Prachi Bridges is a 78 y/o F presenting with R total shoulder, Ramirez protocol. Pt supine on entry on RA with spouse present. Pt A/O x 4. Applicable PMHx: anxiety, depression, PD, osteoporosis. Pt denied light headedness, dizziness or SOB. Pt reports no fall(s) in past 6 months.

## 2023-05-09 NOTE — DISCHARGE SUMMARY
DC Summary:    Patient ID:  Griselda Pelayo  [id number]   51 y.o.  1945    Admit date: 5/8/2023    Discharge Date: 5/9/2023      Admitting Physician: Yodit Spann MD     Discharge Physician: CANDIS Arango    Admission Diagnoses: Primary osteoarthritis of right shoulder [M19.011]  History of osteoporosis [Z87.39]  Right rotator cuff tear arthropathy [M75.101, M12.811]  Arthritis of right shoulder region [M19.011]    Last Procedure: Procedure(s):  RIGHT SHOULDER TOTAL ARTHROPLASTY REVERSE -REGIONAL BLOCK -23 HOUR OBSERVATION    Discharge Diagnoses: Principal Problem:    Arthritis of right shoulder region  Active Problems:    History of osteoporosis    Primary osteoarthritis of right shoulder    Right rotator cuff tear arthropathy  Resolved Problems:    * No resolved hospital problems. *       Consults: none    Significant Diagnostic Studies: labs: hgb 10     Hospital Course:   Normal hospital course for this procedure. Disposition: Home    Patient Instructions:   Current Discharge Medication List        CONTINUE these medications which have NOT CHANGED    Details   oxyCODONE-acetaminophen (PERCOCET) 7.5-325 MG per tablet Take 1-2 tablets by mouth every 4-6 hours as needed for Pain for up to 3 days. Start medication night of surgery. Max Daily Amount: 12 tablets  Qty: 36 tablet, Refills: 0    Comments: Supervising physician: Darrion Cokeriver: [id number].   If out of medication please call prescriber directly at [phone]  Associated Diagnoses: Primary osteoarthritis, right shoulder      aspirin 325 MG tablet Take 1 tablet by mouth in the morning and at bedtime for 7 days To start after surgery  Qty: 14 tablet, Refills: 0      senna-docusate (PERICOLACE) 8.6-50 MG per tablet Take 1 tablet by mouth daily Take for constipation prophylaxis  Qty: 21 tablet, Refills: 0      ondansetron (ZOFRAN-ODT) 8 MG TBDP disintegrating tablet Place 0.5 tablets under the tongue in the morning and 0.5 tablets at noon

## 2023-05-09 NOTE — CARE COORDINATION
Chart reviewed by  for potential discharge needs or concerns. Pt was medically cleared for dc to home today. She will have outpatient PT/OT services as arranged by Dr. Tomas Sanchez office. No other dc needs or concerns identified or reported. 05/09/23 1704   Service Assessment   Patient Orientation Alert and Oriented   Cognition Alert   History Provided By Medical Record   Primary Caregiver Spouse   Support Systems Spouse/Significant Other   Patient's Healthcare Decision Maker is: Legal Next of Kin   PCP Verified by CM Yes   Prior Functional Level Independent in ADLs/IADLs   Current Functional Level Assistance with the following:;Bathing;Dressing   Can patient return to prior living arrangement Yes   Ability to make needs known: Good   Family able to assist with home care needs: Yes   Would you like for me to discuss the discharge plan with any other family members/significant others, and if so, who? No   Financial Resources [employer] Resources None   Social/Functional History   Lives With Spouse   Type of Home House   Home Layout Two level;Performs ADL's on one level; Able to Live on Main level with bedroom/bathroom   Home Access Stairs to enter with rails   Entrance Stairs - Number of Steps 4   Bathroom Shower/Tub [address] Help From Family   ADL Assistance Independent   Homemaking Assistance Independent   Ambulation Assistance Independent   Transfer Assistance Independent   Occupation Retired   Discharge Planning   Type of [address] Prior To Admission None   Potential Assistance Needed Outpatient PT/OT   Potential Assistance Purchasing Medications No   Type of [address] None   Patient expects to be discharged to: [facility] Discharge   Transition of [address] (CM Consult) [address] [facility]   [facility]

## 2023-05-11 ENCOUNTER — TELEPHONE (OUTPATIENT)
Dept: ORTHOPEDIC SURGERY | Age: 78
End: 2023-05-11

## 2023-05-11 NOTE — TELEPHONE ENCOUNTER
Spoke with pt and her  and asked them if they picked up all the prescriptions that we sent. They thought that they did but when asking them about the 2 stool softeners that we sent in they did not have either one. I asked them to call their pharmacy and see if they had them ready for them. I also stated that they can take an over the the counter laxative and stool softer if they need as well. Her  stated that he would go to the [facility] and ask if they had the medication.

## 2023-05-11 NOTE — TELEPHONE ENCOUNTER
She is having trouble with constipation and wondering if something can be prescribed to the pharmacy on file.

## 2023-05-19 ENCOUNTER — OFFICE VISIT (OUTPATIENT)
Dept: ORTHOPEDIC SURGERY | Age: 78
End: 2023-05-19

## 2023-05-19 DIAGNOSIS — Z09 SURGERY FOLLOW-UP: ICD-10-CM

## 2023-05-19 DIAGNOSIS — M19.011 PRIMARY OSTEOARTHRITIS, RIGHT SHOULDER: Primary | ICD-10-CM

## 2023-05-19 RX ORDER — TRAMADOL HYDROCHLORIDE 50 MG/1
50-100 TABLET ORAL NIGHTLY PRN
Qty: 20 TABLET | Refills: 0 | Status: SHIPPED | OUTPATIENT
Start: 2023-05-19 | End: 2023-05-29

## 2023-05-19 RX ORDER — MELOXICAM 7.5 MG/1
7.5 TABLET ORAL DAILY
Qty: 14 TABLET | Refills: 0 | Status: SHIPPED | OUTPATIENT
Start: 2023-05-19 | End: 2023-06-02

## 2023-05-19 NOTE — PROGRESS NOTES
Name: Ariel Howard  YOB: 1945  Gender: female  MRN: [id number]    CC:   Chief Complaint   Patient presents with    Follow-up     1st post op right reverse TSA DOS 5/8/23   , Patient is here to follow-up one week status post rightTSA. Right Shoulder Total Arthroplasty Reverse -regional Block -23 Hour Observation - Right  5/8/2023    HPI: The patient is doing well and as expected. The patient has been following protocol and comes into the office today with use of the sling. Review of Systems  Noncontributory     PE right shoulder: Operative shoulder exam:  The incisions are clean, dry and intact. There is no sign of infection. The axillary sensation is intact. The deltoid muscle has good firing. The shoulder is supple. They are neurovascularly intact. Range of Motion was not tested today. X-ray:  AP and Axillary of the right shoulder views show intact prosthesis and the components in place. No fractures are evident. AP:     ICD-10-CM    1. Primary osteoarthritis, right shoulder  M19.011 XR SHOULDER RIGHT (MIN 2 VIEWS)      2. Surgery follow-up  Z09 XR SHOULDER RIGHT (MIN 2 VIEWS)        The patient is doing well one week status post TSA. They were not given a Physical Therapy prescription and protocol for a rightTotal Shoulder Arthroplasty. They will begin physical therapy this week if they have not already started. Tramadol will be provided. Meloxicam also. Trying to wean off of the Percocet. We will start therapy at the next visit. Return in about 4 weeks (around 6/16/2023) for post op on Reji's schedule, TSA x-ray fu Grashey / Axillary. They need to return to the clinic in 4 weeks time for re-evaluation and repeat xrays.      Gilda Henry MD  05/19/23

## 2023-05-22 ENCOUNTER — TELEPHONE (OUTPATIENT)
Dept: ORTHOPEDIC SURGERY | Age: 78
End: 2023-05-22

## 2023-05-22 NOTE — TELEPHONE ENCOUNTER
Spoke with pt and she just wanted to know if she can put some powder under her arm as it is getting irritated. I let her know that this was okay to do. She expressed understanding and thanked me for calling.

## 2023-05-30 ENCOUNTER — TELEPHONE (OUTPATIENT)
Dept: NEUROLOGY | Age: 78
End: 2023-05-30

## 2023-05-31 DIAGNOSIS — M19.011 PRIMARY OSTEOARTHRITIS, RIGHT SHOULDER: ICD-10-CM

## 2023-05-31 DIAGNOSIS — Z09 SURGERY FOLLOW-UP: ICD-10-CM

## 2023-06-01 RX ORDER — MELOXICAM 7.5 MG/1
TABLET ORAL
Qty: 14 TABLET | Refills: 0 | OUTPATIENT
Start: 2023-06-01

## 2023-06-07 NOTE — TELEPHONE ENCOUNTER
Called patient to advise to shorten between doses per Dr. Vielka Goldman says she understands and will follow protocol.

## 2023-06-19 ENCOUNTER — TELEPHONE (OUTPATIENT)
Dept: NEUROLOGY | Age: 78
End: 2023-06-19

## 2023-06-19 NOTE — TELEPHONE ENCOUNTER
Patient left voicemail stating she is not sleeping and her legs are jumping and aching. Wants to know if there is a medication to help or what she should do.

## 2023-06-20 NOTE — TELEPHONE ENCOUNTER
Is she still taking her bedtime dose of sinemet CR? If not, restart it. If she is and this is happening at night then increase to 2 tabs Qhs and please adjust the Rx.

## 2023-06-23 RX ORDER — CARBIDOPA AND LEVODOPA 50; 200 MG/1; MG/1
TABLET, EXTENDED RELEASE ORAL
Qty: 90 TABLET | Refills: 3 | Status: CANCELLED | OUTPATIENT
Start: 2023-06-23

## 2023-06-23 NOTE — TELEPHONE ENCOUNTER
Called patient and ask if she was still taking her Sinemet CR states she is. Advised Per Dr. Reed End she can increase to 2 tabs QHS. Medication has been updated.

## 2023-06-26 ENCOUNTER — TELEPHONE (OUTPATIENT)
Dept: NEUROLOGY | Age: 78
End: 2023-06-26

## 2023-07-11 RX ORDER — PRAMIPEXOLE DIHYDROCHLORIDE 0.5 MG/1
TABLET ORAL
Qty: 180 TABLET | Refills: 3 | Status: SHIPPED | OUTPATIENT
Start: 2023-07-11

## 2023-07-11 NOTE — PROGRESS NOTES
good   Benefits and precautions of treatment explained to patient. Sabas Gonzalez is a 77 y.o. female who presents to therapy today with evolving/changing clinical presentation (moderate complexity)  related to s/p R reverse TSA. Pt would benefit from skilled physical therapy services to address the deficits noted above for return to prior level of function. PLAN OF CARE   Next visit: initiate pulleys, table slides, isometrics, continue PROM    Effective Dates: 7/12/2023 TO 10/11/2023 (90 days). Frequency/Duration: 2x/week for 90 Day(s)  Interventions may include but are not limited to: (70903) Therapeutic exercise to develop ROM, strength, endurance and flexibility  (94183) Therapeutic activities using dynamic activities to improve function  (78546) Manual therapy techniques to improve joint and/or soft tissue mobility, ROM, and function as well as helping to decrease pain/spasms and swelling  (07148) Neuromuscular reeducation addressing impaired balance, coordination, kinesthetic sense, posture and proprioception  (59337) Self-care/home management training to improve activities of daily living skills and compensatory techniques to achieve independence  Home exercise program (HEP) development  Modalities prn to address pain, spasms, and swelling: (33217) Electrical stimulation - attended  (69148/) Electrical stimulation- unattended  (14562) Vasopneumatic compression  (50345) Hot/cold pack    The referring physician has reviewed and approved this evaluation and plan of care as noted by the electronic signature attached to note. GOALS     Short term goals to be met by 8/10/2023  (4 weeks):  Pt will demonstrate good recall of HEP requiring minimal verbal cuing for proper form and technique. Pt. will be minimally tender with palpation to improve ability to lie/sleep on side. Pt to subjectively report </= 3/10 pain to allow for increased participation in functional movements.   Increase PROM of

## 2023-07-11 NOTE — TELEPHONE ENCOUNTER
Reason for call: Prescription refill    Medication:pramipexole (MIRAPEX) 0.5 MG tablet      [facility]: [id number] - [address], [address] [facility]    Last visit:4/28/23    Next visit:7/12/23    Contact information: [phone]

## 2023-07-12 ENCOUNTER — EVALUATION (OUTPATIENT)
Age: 78
End: 2023-07-12

## 2023-07-12 DIAGNOSIS — M25.511 CHRONIC RIGHT SHOULDER PAIN: ICD-10-CM

## 2023-07-12 DIAGNOSIS — G89.29 CHRONIC RIGHT SHOULDER PAIN: ICD-10-CM

## 2023-07-12 DIAGNOSIS — Z09 SURGERY FOLLOW-UP: ICD-10-CM

## 2023-07-12 DIAGNOSIS — M25.611 STIFFNESS OF RIGHT SHOULDER JOINT: ICD-10-CM

## 2023-07-12 DIAGNOSIS — R29.898 SHOULDER WEAKNESS: Primary | ICD-10-CM

## 2023-07-12 DIAGNOSIS — M19.011 PRIMARY OSTEOARTHRITIS, RIGHT SHOULDER: ICD-10-CM

## 2023-07-12 DIAGNOSIS — Z74.09 IMPAIRED FUNCTIONAL MOBILITY AND ACTIVITY TOLERANCE: ICD-10-CM

## 2023-07-17 ENCOUNTER — TREATMENT (OUTPATIENT)
Age: 78
End: 2023-07-17
Payer: MEDICARE

## 2023-07-17 DIAGNOSIS — M25.611 STIFFNESS OF RIGHT SHOULDER JOINT: ICD-10-CM

## 2023-07-17 DIAGNOSIS — M25.511 CHRONIC RIGHT SHOULDER PAIN: ICD-10-CM

## 2023-07-17 DIAGNOSIS — R29.898 SHOULDER WEAKNESS: Primary | ICD-10-CM

## 2023-07-17 DIAGNOSIS — Z74.09 IMPAIRED FUNCTIONAL MOBILITY AND ACTIVITY TOLERANCE: ICD-10-CM

## 2023-07-17 DIAGNOSIS — G89.29 CHRONIC RIGHT SHOULDER PAIN: ICD-10-CM

## 2023-07-17 PROCEDURE — 97110 THERAPEUTIC EXERCISES: CPT | Performed by: PHYSICAL THERAPIST

## 2023-07-19 ENCOUNTER — TELEPHONE (OUTPATIENT)
Dept: ORTHOPEDIC SURGERY | Age: 78
End: 2023-07-19

## 2023-07-19 NOTE — TELEPHONE ENCOUNTER
Spoke with pt daughter and she states that she left a message about what medication they were asking about specifically. I told her that the message I got was just asking about medication and she could not remember the name of the medication that she was calling about. But they want to know about it with her everyday meds so I informed her that I would call who prescribes those to ask them as those are not prescribed by us.  She is also calling because of the pain in her leg, the shoulder is doing well so I also mentioned that she may want to reach back out about maybe getting an apt for that sooner if we have a cancellation list.

## 2023-07-19 NOTE — TELEPHONE ENCOUNTER
She is wondering if she can take a certain medication along with the medication she's taking. Please call.

## 2023-07-21 ENCOUNTER — TREATMENT (OUTPATIENT)
Age: 78
End: 2023-07-21

## 2023-07-21 ENCOUNTER — TELEPHONE (OUTPATIENT)
Dept: ORTHOPEDIC SURGERY | Age: 78
End: 2023-07-21

## 2023-07-21 DIAGNOSIS — R29.898 SHOULDER WEAKNESS: Primary | ICD-10-CM

## 2023-07-21 DIAGNOSIS — M25.611 STIFFNESS OF RIGHT SHOULDER JOINT: ICD-10-CM

## 2023-07-21 DIAGNOSIS — Z74.09 IMPAIRED FUNCTIONAL MOBILITY AND ACTIVITY TOLERANCE: ICD-10-CM

## 2023-07-21 NOTE — PROGRESS NOTES
function. Long term goals to be met by 10/5/2023  (12 weeks):  Pt will be compliant and independent with comprehensive HEP and activity progression. Pt to subjectively report </= 2/10 pain with shoulder movements to allow for return to PLOF. Demonstrate AROM of involved shoulder to >/= Flexion: 120, Scaption: 120; IR behind back to L1; ER to 50 for improved ability to dress, groom with minimal limitations from shoulder. MMT involved Shoulder to >/= 4/5 allowing for normal lifting, reaching and pushing/pulling associated with ADLs. Improve QuickDASH Score to </=  35% impairment, demonstrating improved overall function. Pt will self-report attainment of patient's functional goals regarding return to driving and I with dressing and grooming. Discharged from [address]  Access Code: [email or web address]  URL: [email or web address]  Date: 07/17/2023  Prepared by: Donavan Welch    Exercises  - Standing Backward Shoulder Rolls  - 3 x daily - 1-3 sets - 10 reps - 5 hold  - Standing Shoulder Row  - 1 x daily - 1-3 sets - 10 reps - 5 hold  - Supine Shoulder Flexion with Dowel  - 5 x weekly - 3 sets - 10-15 reps  - Supine Shoulder External Rotation with Dowel  - 1 x daily - 5 x weekly - 1-3 sets - 10 reps - 5 hold  - Isometric Shoulder External Rotation  - 1-2 x daily - 10 reps - 10 sec hold  - Isometric Shoulder Internal Rotation  - 1-2 x daily - 10 reps - 10 sec hold  - Isometric Shoulder Flexion  - 1-2 x daily - 10 reps - 10 sec hold

## 2023-07-21 NOTE — TELEPHONE ENCOUNTER
She had surgery with Dr. Clarisa Whitman and needs his approval to get a Prolea injection.  Please leave a message if she doesn't answer

## 2023-07-25 ENCOUNTER — TREATMENT (OUTPATIENT)
Age: 78
End: 2023-07-25
Payer: MEDICARE

## 2023-07-25 DIAGNOSIS — M25.511 CHRONIC RIGHT SHOULDER PAIN: ICD-10-CM

## 2023-07-25 DIAGNOSIS — R29.898 SHOULDER WEAKNESS: Primary | ICD-10-CM

## 2023-07-25 DIAGNOSIS — M25.611 STIFFNESS OF RIGHT SHOULDER JOINT: ICD-10-CM

## 2023-07-25 DIAGNOSIS — G89.29 CHRONIC RIGHT SHOULDER PAIN: ICD-10-CM

## 2023-07-25 DIAGNOSIS — Z74.09 IMPAIRED FUNCTIONAL MOBILITY AND ACTIVITY TOLERANCE: ICD-10-CM

## 2023-07-25 PROCEDURE — 97110 THERAPEUTIC EXERCISES: CPT | Performed by: PHYSICAL THERAPIST

## 2023-07-25 RX ORDER — OMEPRAZOLE 40 MG/1
CAPSULE, DELAYED RELEASE ORAL
Qty: 90 CAPSULE | Refills: 1 | OUTPATIENT
Start: 2023-07-25

## 2023-07-25 NOTE — TELEPHONE ENCOUNTER
Dr. Armani Ried stated it is fine for pt to get Prolia injections. I let her daughter know. He daughter asked about pts appointment for her hip. She states she is in severe pain and would like to be seen before 8/15 if possible. I told her I would send a message to Dr. Christian Quach assistants to see if they could get her in sooner but I can't guarantee they can bc Dr. Sergo Riley is out of the office this week.

## 2023-07-26 ENCOUNTER — TELEPHONE (OUTPATIENT)
Dept: ORTHOPEDIC SURGERY | Age: 78
End: 2023-07-26

## 2023-07-26 NOTE — TELEPHONE ENCOUNTER
I called and spoke to patients daughter and let her know that our Nurse Practitioner can see her mom tomorrow at 3:00pm. She is going to call back and let me know if that works for her or not.

## 2023-07-27 ENCOUNTER — OFFICE VISIT (OUTPATIENT)
Dept: ORTHOPEDIC SURGERY | Age: 78
End: 2023-07-27
Payer: MEDICARE

## 2023-07-27 DIAGNOSIS — M16.12 PRIMARY OSTEOARTHRITIS OF LEFT HIP: ICD-10-CM

## 2023-07-27 DIAGNOSIS — M25.562 LEFT KNEE PAIN, UNSPECIFIED CHRONICITY: Primary | ICD-10-CM

## 2023-07-27 PROCEDURE — G8399 PT W/DXA RESULTS DOCUMENT: HCPCS | Performed by: NURSE PRACTITIONER

## 2023-07-27 PROCEDURE — 1036F TOBACCO NON-USER: CPT | Performed by: NURSE PRACTITIONER

## 2023-07-27 PROCEDURE — 99213 OFFICE O/P EST LOW 20 MIN: CPT | Performed by: NURSE PRACTITIONER

## 2023-07-27 PROCEDURE — G8428 CUR MEDS NOT DOCUMENT: HCPCS | Performed by: NURSE PRACTITIONER

## 2023-07-27 PROCEDURE — 1123F ACP DISCUSS/DSCN MKR DOCD: CPT | Performed by: NURSE PRACTITIONER

## 2023-07-27 PROCEDURE — 1090F PRES/ABSN URINE INCON ASSESS: CPT | Performed by: NURSE PRACTITIONER

## 2023-07-27 PROCEDURE — G8420 CALC BMI NORM PARAMETERS: HCPCS | Performed by: NURSE PRACTITIONER

## 2023-07-27 RX ORDER — TRAMADOL HYDROCHLORIDE 50 MG/1
50 TABLET ORAL EVERY 8 HOURS PRN
Qty: 21 TABLET | Refills: 0 | Status: SHIPPED | OUTPATIENT
Start: 2023-07-27 | End: 2023-08-03

## 2023-07-27 RX ORDER — TRAMADOL HYDROCHLORIDE 50 MG/1
50 TABLET ORAL EVERY 8 HOURS PRN
Qty: 21 TABLET | Refills: 0 | Status: SHIPPED | OUTPATIENT
Start: 2023-07-27 | End: 2023-07-27 | Stop reason: SDUPTHER

## 2023-07-27 NOTE — PROGRESS NOTES
Patient ID:    Landry Dubose  [id number]  56 y.o.  1945    Today: July 27, 2023          Chief Complaint: Left hip pain      Patient returns to office today with chief complaint of left hip pain. The pain is predominately localized to the anterior groin and is described as a general ache with occasional sharp pain. They rate the pain ranging from 3-8 on the pain scale occurring in a cyclical fashion with periods of acute exacerbation. Symptoms are exacerbated with getting into and out of their vehicle, crossing their legs, and attempting to put on socks/shoes. No significant pain noted with laying on the affected hip. No numbness of tingling going down the extremity. Patient has attempted prior conservative treatment including Tylenol, Tramadol, and PO prednisone. Past Medical History:  Past Medical History:   Diagnosis Date    Allergic rhinitis     Anxiety     AR (allergic rhinitis)     Depression     Headache(784.0)     History of osteoporosis 8/20/2015    Overview:  12/14 BMD osteopenia  Last Assessment & Plan:  Continue current regimen     Hypercholesterolemia     Hypertension     Insomnia     Left arm pain 2/3/2016    Migraine without status migrainosus, not intractable     Osteoarthritis     Osteoporosis     Parkinson's disease (HCC)     PONV (postoperative nausea and vomiting)     RLS (restless legs syndrome)     Tremor        Past Surgical History:  Past Surgical History:   Procedure Laterality Date    COLONOSCOPY      several    EXTRAC ERUPTED TOOTH/EXPOSED ROOT      SHOULDER SURGERY Right 5/8/2023    RIGHT SHOULDER TOTAL ARTHROPLASTY REVERSE -REGIONAL BLOCK -23 HOUR OBSERVATION performed by Nan Ortez MD at [address]        Medications:     Prior to Admission medications    Medication Sig Start Date End Date Taking?  Authorizing Provider   pramipexole (MIRAPEX) 0.5 MG tablet 1 tab at 6pm and Qhs 7/11/23   Briseyda Fitzpatrick MD   meloxicam (MOBIC) 7.5 MG tablet

## 2023-07-28 NOTE — PROGRESS NOTES
[facility] Valerie Will  [address] [place] [address]  Dept: [phone]      Physical Therapy Daily Note     Referring MD: CANDIS Cruz  Diagnosis:     ICD-10-CM    1. Shoulder weakness  R29.898       2. Stiffness of right shoulder joint  M25.611       3. Impaired functional mobility and activity tolerance  Z74.09       4. Chronic right shoulder pain  M25.511     G89.29          Surgery: R reverse TSA  Date: 5/8/2023  Therapy precautions:Latex allergy  Co-morbidities affecting plan of care: Parkinson's Disease, Osteoporosis, L hip pain- using cane in L UE for balance    Total Timed Codes: 45 min, Total Treatment Time: 45 min  Modifier needed: No    Episode visit count:  5     PERTINENT MEDICAL HISTORY     Past medical and surgical history:   Past Medical History:   Diagnosis Date    Allergic rhinitis     Anxiety     AR (allergic rhinitis)     Depression     Headache(784.0)     History of osteoporosis 8/20/2015    Overview:  12/14 BMD osteopenia  Last Assessment & Plan:  Continue current regimen     Hypercholesterolemia     Hypertension     Insomnia     Left arm pain 2/3/2016    Migraine without status migrainosus, not intractable     Osteoarthritis     Osteoporosis     Parkinson's disease (HCC)     PONV (postoperative nausea and vomiting)     RLS (restless legs syndrome)     Tremor       Past Surgical History:   Procedure Laterality Date    COLONOSCOPY      several    EXTRAC ERUPTED TOOTH/EXPOSED ROOT      SHOULDER SURGERY Right 5/8/2023    RIGHT SHOULDER TOTAL ARTHROPLASTY REVERSE -REGIONAL BLOCK -23 HOUR OBSERVATION performed by Mikael Charles MD at [address]     Medications: reviewed in chart   Allergies:    Allergies   Allergen Reactions    Latex     Sulfa Antibiotics Nausea And Vomiting and Nausea Only        Diagnostic exams (per chart review): no change in the hardware's alignment and the glenohumeral position is appropriate on all the films    Chief

## 2023-07-31 ENCOUNTER — TREATMENT (OUTPATIENT)
Age: 78
End: 2023-07-31
Payer: MEDICARE

## 2023-07-31 DIAGNOSIS — M25.511 CHRONIC RIGHT SHOULDER PAIN: ICD-10-CM

## 2023-07-31 DIAGNOSIS — M25.611 STIFFNESS OF RIGHT SHOULDER JOINT: ICD-10-CM

## 2023-07-31 DIAGNOSIS — Z74.09 IMPAIRED FUNCTIONAL MOBILITY AND ACTIVITY TOLERANCE: ICD-10-CM

## 2023-07-31 DIAGNOSIS — R29.898 SHOULDER WEAKNESS: Primary | ICD-10-CM

## 2023-07-31 DIAGNOSIS — G89.29 CHRONIC RIGHT SHOULDER PAIN: ICD-10-CM

## 2023-07-31 PROCEDURE — 97110 THERAPEUTIC EXERCISES: CPT | Performed by: PHYSICAL THERAPIST

## 2023-07-31 PROCEDURE — 97140 MANUAL THERAPY 1/> REGIONS: CPT | Performed by: PHYSICAL THERAPIST

## 2023-08-03 ENCOUNTER — OFFICE VISIT (OUTPATIENT)
Dept: ORTHOPEDIC SURGERY | Age: 78
End: 2023-08-03

## 2023-08-03 DIAGNOSIS — M19.011 PRIMARY OSTEOARTHRITIS, RIGHT SHOULDER: Primary | ICD-10-CM

## 2023-08-03 DIAGNOSIS — Z09 SURGERY FOLLOW-UP: ICD-10-CM

## 2023-08-03 PROCEDURE — 99024 POSTOP FOLLOW-UP VISIT: CPT | Performed by: PHYSICIAN ASSISTANT

## 2023-08-03 RX ORDER — CARBIDOPA AND LEVODOPA 50; 200 MG/1; MG/1
TABLET, EXTENDED RELEASE ORAL
Qty: 90 TABLET | Refills: 3 | OUTPATIENT
Start: 2023-08-03

## 2023-08-03 NOTE — TELEPHONE ENCOUNTER
Reason for call: Prescription refill    Medication: carbidopa-levodopa (SINEMET CR)  MG per extended release tablet    Pharmacy: [address] Ingrid Garcia, [address] [place] [address] Jose [phone]    Last visit: 4/28/23    Next visit: 8/3/23    Contact information: [phone]

## 2023-08-07 ENCOUNTER — TREATMENT (OUTPATIENT)
Age: 78
End: 2023-08-07

## 2023-08-07 DIAGNOSIS — M25.612 SHOULDER STIFFNESS, LEFT: ICD-10-CM

## 2023-08-07 DIAGNOSIS — M25.512 CHRONIC LEFT SHOULDER PAIN: Primary | ICD-10-CM

## 2023-08-07 DIAGNOSIS — G89.29 CHRONIC LEFT SHOULDER PAIN: Primary | ICD-10-CM

## 2023-08-07 DIAGNOSIS — M62.81 MUSCLE WEAKNESS (GENERALIZED): ICD-10-CM

## 2023-08-07 NOTE — PROGRESS NOTES
Name: Austin Tabor  YOB: 1945  Gender: female  MRN: [id number]    CC:   Chief Complaint   Patient presents with    Follow-up     S/P right reverse TS DOS 5/8/23   , The patient follows up 12 weeks status post right TSA. Right Shoulder Total Arthroplasty Reverse -regional Block -23 Hour Observation - Right  5/8/2023    HPI: The patient is doing well. They feel as if their pain has decreased. Her biggest complaint at this time is her hip and knee pain. States she hasn't been working much on her shoulder due to her pain elswhere. They have no complaints today, only questions on their progression. They feel as if they are progressing as they should.    They are still doing physical therapy and a home exercise program.    Allergies   Allergen Reactions    Latex     Sulfa Antibiotics Nausea And Vomiting and Nausea Only     Past Medical History:   Diagnosis Date    Allergic rhinitis     Anxiety     AR (allergic rhinitis)     Depression     Headache(784.0)     History of osteoporosis 8/20/2015    Overview:  12/14 BMD osteopenia  Last Assessment & Plan:  Continue current regimen     Hypercholesterolemia     Hypertension     Insomnia     Left arm pain 2/3/2016    Migraine without status migrainosus, not intractable     Osteoarthritis     Osteoporosis     Parkinson's disease (HCC)     PONV (postoperative nausea and vomiting)     RLS (restless legs syndrome)     Tremor      Past Surgical History:   Procedure Laterality Date    COLONOSCOPY      several    EXTRAC ERUPTED TOOTH/EXPOSED ROOT      SHOULDER SURGERY Right 5/8/2023    RIGHT SHOULDER TOTAL ARTHROPLASTY REVERSE -REGIONAL BLOCK -23 HOUR OBSERVATION performed by Janie Aragon MD at [address]     Family History   Problem Relation Age of Onset    Diabetes Maternal Grandmother     Heart Disease Father     Cancer Father         Bladder Ca    Depression Father     Dementia Father     Stroke Father     Suicide Father     Other

## 2023-08-09 ENCOUNTER — TELEPHONE (OUTPATIENT)
Dept: ORTHOPEDIC SURGERY | Age: 78
End: 2023-08-09

## 2023-08-09 NOTE — TELEPHONE ENCOUNTER
She will be seeing Dr. Tam Cortes next Tuesday. She wants to know if she can go ahead and get a surgery date now instead of waiting another week. She is having left hip pain. Isabel Ridley told her he was sure she would need surgery. She is asking for a return call.

## 2023-08-10 RX ORDER — CARBIDOPA AND LEVODOPA 50; 200 MG/1; MG/1
TABLET, EXTENDED RELEASE ORAL
Qty: 60 TABLET | Refills: 0 | Status: SHIPPED | OUTPATIENT
Start: 2023-08-10

## 2023-08-10 RX ORDER — CARBIDOPA AND LEVODOPA 50; 200 MG/1; MG/1
TABLET, EXTENDED RELEASE ORAL
Qty: 180 TABLET | Refills: 3 | Status: SHIPPED | OUTPATIENT
Start: 2023-08-10

## 2023-08-10 NOTE — TELEPHONE ENCOUNTER
Patients  stopped by the office upset that the requested refill has not been sent in. Patient is in need of Sinemet CR  2 tabs at bedtime, 30-day supply sent to the [facility] on [address] and a 90-day supply sent to [facility].

## 2023-08-14 ENCOUNTER — TREATMENT (OUTPATIENT)
Age: 78
End: 2023-08-14
Payer: MEDICARE

## 2023-08-14 DIAGNOSIS — M25.611 STIFFNESS OF RIGHT SHOULDER JOINT: ICD-10-CM

## 2023-08-14 DIAGNOSIS — G89.29 CHRONIC RIGHT SHOULDER PAIN: ICD-10-CM

## 2023-08-14 DIAGNOSIS — M25.511 CHRONIC RIGHT SHOULDER PAIN: ICD-10-CM

## 2023-08-14 DIAGNOSIS — M25.512 CHRONIC LEFT SHOULDER PAIN: Primary | ICD-10-CM

## 2023-08-14 DIAGNOSIS — Z74.09 IMPAIRED FUNCTIONAL MOBILITY AND ACTIVITY TOLERANCE: ICD-10-CM

## 2023-08-14 DIAGNOSIS — M25.612 SHOULDER STIFFNESS, LEFT: ICD-10-CM

## 2023-08-14 DIAGNOSIS — R29.898 SHOULDER WEAKNESS: ICD-10-CM

## 2023-08-14 DIAGNOSIS — M62.81 MUSCLE WEAKNESS (GENERALIZED): ICD-10-CM

## 2023-08-14 DIAGNOSIS — G89.29 CHRONIC LEFT SHOULDER PAIN: Primary | ICD-10-CM

## 2023-08-14 PROCEDURE — 97110 THERAPEUTIC EXERCISES: CPT | Performed by: PHYSICAL THERAPIST

## 2023-08-14 PROCEDURE — 97140 MANUAL THERAPY 1/> REGIONS: CPT | Performed by: PHYSICAL THERAPIST

## 2023-08-14 NOTE — PROGRESS NOTES
[facility] STELLA Natalia Milner  [address] [facility] [address]  Dept: [phone]      Physical Therapy Daily Note     Referring MD: CANDIS Khalil Si  Diagnosis: R Reverse TSA    ICD-10-CM    1. Chronic left shoulder pain  M25.512     G89.29       2. Muscle weakness (generalized)  M62.81       3. Shoulder stiffness, left  M25.612       4. Shoulder weakness  R29.898       5. Stiffness of right shoulder joint  M25.611       6. Impaired functional mobility and activity tolerance  Z74.09       7. Chronic right shoulder pain  M25.511     G89.29              Surgery: R reverse TSA  Date: 5/8/2023  Therapy precautions:Latex allergy   Co-morbidities affecting plan of care: Parkinson's Disease, Osteoporosis, L hip pain- using rollator for balance     Total Timed Codes: 45 min, Total Treatment Time: 50 min  Modifier needed: No    Episode visit count:  7     PERTINENT MEDICAL HISTORY     Past medical and surgical history:   Past Medical History:   Diagnosis Date    Allergic rhinitis     Anxiety     AR (allergic rhinitis)     Depression     Headache(784.0)     History of osteoporosis 8/20/2015    Overview:  12/14 BMD osteopenia  Last Assessment & Plan:  Continue current regimen     Hypercholesterolemia     Hypertension     Insomnia     Left arm pain 2/3/2016    Migraine without status migrainosus, not intractable     Osteoarthritis     Osteoporosis     Parkinson's disease (HCC)     PONV (postoperative nausea and vomiting)     RLS (restless legs syndrome)     Tremor       Past Surgical History:   Procedure Laterality Date    COLONOSCOPY      several    EXTRAC ERUPTED TOOTH/EXPOSED ROOT      SHOULDER SURGERY Right 5/8/2023    RIGHT SHOULDER TOTAL ARTHROPLASTY REVERSE -REGIONAL BLOCK -23 HOUR OBSERVATION performed by Mirza Guan MD at [address]     Medications: reviewed in chart   Allergies:    Allergies   Allergen Reactions    Latex     Sulfa Antibiotics Nausea And Vomiting and Nausea

## 2023-08-17 ENCOUNTER — OFFICE VISIT (OUTPATIENT)
Dept: ORTHOPEDIC SURGERY | Age: 78
End: 2023-08-17

## 2023-08-17 DIAGNOSIS — M25.559 HIP PAIN, ACUTE, UNSPECIFIED LATERALITY: Primary | ICD-10-CM

## 2023-08-17 NOTE — PROGRESS NOTES
Patient ID:    Sarina Earl  [id number]  73 y.o.  1945    Today: August 17, 2023          Chief Complaint: Left hip pain        Patient reports longstanding history of left hip pain. The pain is predominately localized to the anterior groin and is described as a  general ache with occasional sharp pain. They rate the pain ranging from 3-8 on the pain scale occurring in a cyclical fashion with periods of acute exacerbation. Symptoms are exacerbated with getting into and out of their vehicle, crossing their legs, and attempting to put on socks/shoes. No significant pain noted with laying on the affected hip. The patient's hip pain cause moderate to severe limitations in the activities of rising from bed, putting on socks/shoes, rising from a sitting position, bending towards the floor and kneeling, twisting and pivoting on the limb and squatting. In addition, at times the patient reports extreme difficulty with these activities. No numbness of tingling going down the extremity. Patient has attempted prior conservative treatment including medications, activity modification, strengthening exercise, weight loss (if applicable), +/-hip injections consisting or either trochanteric bursitis injections or intra-articular injections.         Past Medical History:  Past Medical History:   Diagnosis Date    Allergic rhinitis     Anxiety     AR (allergic rhinitis)     Depression     Headache(784.0)     History of osteoporosis 8/20/2015    Overview:  12/14 BMD osteopenia  Last Assessment & Plan:  Continue current regimen     Hypercholesterolemia     Hypertension     Insomnia     Left arm pain 2/3/2016    Migraine without status migrainosus, not intractable     Osteoarthritis     Osteoporosis     Parkinson's disease (HCC)     PONV (postoperative nausea and vomiting)     RLS (restless legs syndrome)     Tremor        Past Surgical History:  Past Surgical History:   Procedure Laterality Date    COLONOSCOPY

## 2023-08-18 ENCOUNTER — TREATMENT (OUTPATIENT)
Age: 78
End: 2023-08-18

## 2023-08-18 DIAGNOSIS — R29.898 SHOULDER WEAKNESS: ICD-10-CM

## 2023-08-18 DIAGNOSIS — Z74.09 IMPAIRED FUNCTIONAL MOBILITY AND ACTIVITY TOLERANCE: ICD-10-CM

## 2023-08-18 DIAGNOSIS — M25.511 CHRONIC RIGHT SHOULDER PAIN: ICD-10-CM

## 2023-08-18 DIAGNOSIS — M16.12 PRIMARY OSTEOARTHRITIS OF LEFT HIP: Primary | ICD-10-CM

## 2023-08-18 DIAGNOSIS — M25.612 SHOULDER STIFFNESS, LEFT: ICD-10-CM

## 2023-08-18 DIAGNOSIS — G89.29 CHRONIC RIGHT SHOULDER PAIN: ICD-10-CM

## 2023-08-18 DIAGNOSIS — G89.29 CHRONIC LEFT SHOULDER PAIN: Primary | ICD-10-CM

## 2023-08-18 DIAGNOSIS — M25.512 CHRONIC LEFT SHOULDER PAIN: Primary | ICD-10-CM

## 2023-08-18 DIAGNOSIS — M25.611 STIFFNESS OF RIGHT SHOULDER JOINT: ICD-10-CM

## 2023-08-18 DIAGNOSIS — M62.81 MUSCLE WEAKNESS (GENERALIZED): ICD-10-CM

## 2023-08-18 NOTE — PROGRESS NOTES
[facility] [address] [address]  Dept: [phone]      Physical Therapy Reassessment     Referring MD: CANDIS Tavares  Diagnosis: R Reverse TSA    ICD-10-CM    1. Chronic left shoulder pain  M25.512     G89.29       2. Muscle weakness (generalized)  M62.81       3. Shoulder stiffness, left  M25.612       4. Shoulder weakness  R29.898       5. Stiffness of right shoulder joint  M25.611       6. Impaired functional mobility and activity tolerance  Z74.09       7. Chronic right shoulder pain  M25.511     G89.29              Surgery: R reverse TSA  Date: 5/8/2023  Therapy precautions:Latex allergy   Co-morbidities affecting plan of care: Parkinson's Disease, Osteoporosis, L hip pain- using rollator for balance     Total Timed Codes: 45 min, Total Treatment Time: 50 min  Modifier needed: No    Episode visit count:  8     PERTINENT MEDICAL HISTORY     Past medical and surgical history:   Past Medical History:   Diagnosis Date    Allergic rhinitis     Anxiety     AR (allergic rhinitis)     Depression     Headache(784.0)     History of osteoporosis 8/20/2015    Overview:  12/14 BMD osteopenia  Last Assessment & Plan:  Continue current regimen     Hypercholesterolemia     Hypertension     Insomnia     Left arm pain 2/3/2016    Migraine without status migrainosus, not intractable     Osteoarthritis     Osteoporosis     Parkinson's disease (HCC)     PONV (postoperative nausea and vomiting)     RLS (restless legs syndrome)     Tremor       Past Surgical History:   Procedure Laterality Date    COLONOSCOPY      several    EXTRAC ERUPTED TOOTH/EXPOSED ROOT      SHOULDER SURGERY Right 5/8/2023    RIGHT SHOULDER TOTAL ARTHROPLASTY REVERSE -REGIONAL BLOCK -23 HOUR OBSERVATION performed by Felicity Vann MD at [address]     Medications: reviewed in chart   Allergies:    Allergies   Allergen Reactions    Latex     Sulfa Antibiotics Nausea And Vomiting and

## 2023-08-21 ENCOUNTER — TELEPHONE (OUTPATIENT)
Age: 78
End: 2023-08-21

## 2023-08-22 ENCOUNTER — TELEPHONE (OUTPATIENT)
Age: 78
End: 2023-08-22

## 2023-08-23 ENCOUNTER — TELEPHONE (OUTPATIENT)
Age: 78
End: 2023-08-23

## 2023-08-23 ENCOUNTER — TELEPHONE (OUTPATIENT)
Dept: ORTHOPEDIC SURGERY | Age: 78
End: 2023-08-23

## 2023-08-23 NOTE — TELEPHONE ENCOUNTER
M Offering patient 12:15 appointment with Mustapha Hollis on 8/24. Asked patient to call and confirm.

## 2023-08-24 ENCOUNTER — TELEPHONE (OUTPATIENT)
Age: 78
End: 2023-08-24

## 2023-08-28 ENCOUNTER — TREATMENT (OUTPATIENT)
Age: 78
End: 2023-08-28
Payer: MEDICARE

## 2023-08-28 DIAGNOSIS — M25.612 SHOULDER STIFFNESS, LEFT: ICD-10-CM

## 2023-08-28 DIAGNOSIS — Z74.09 IMPAIRED FUNCTIONAL MOBILITY AND ACTIVITY TOLERANCE: ICD-10-CM

## 2023-08-28 DIAGNOSIS — M25.512 CHRONIC LEFT SHOULDER PAIN: Primary | ICD-10-CM

## 2023-08-28 DIAGNOSIS — G89.29 CHRONIC LEFT SHOULDER PAIN: Primary | ICD-10-CM

## 2023-08-28 DIAGNOSIS — R29.898 SHOULDER WEAKNESS: ICD-10-CM

## 2023-08-28 DIAGNOSIS — M62.81 MUSCLE WEAKNESS (GENERALIZED): ICD-10-CM

## 2023-08-28 PROCEDURE — 97140 MANUAL THERAPY 1/> REGIONS: CPT | Performed by: PHYSICAL THERAPIST

## 2023-08-28 PROCEDURE — 97110 THERAPEUTIC EXERCISES: CPT | Performed by: PHYSICAL THERAPIST

## 2023-08-29 ENCOUNTER — TELEPHONE (OUTPATIENT)
Dept: ORTHOPEDIC SURGERY | Age: 78
End: 2023-08-29

## 2023-08-29 NOTE — TELEPHONE ENCOUNTER
Right now her surgery is Sept. 27. She would like to be put on the cancellation list.   Her son had hip surgery in [place]. He was in a lot of pain and his doctor gave him duexsis bupro en and famotidine 800mg. She wonders if she can take this for pain before her surgery. Please let her know. She does use [facility] in [place]  on [address].

## 2023-08-30 NOTE — TELEPHONE ENCOUNTER
Reason for call: Prescription refill    Medication: Mirapex . 5    Pharmacy: [facility]    Last visit: 6/20/23    Next visit: 11/17/23    Contact information: [phone]

## 2023-08-31 RX ORDER — PRAMIPEXOLE DIHYDROCHLORIDE 0.5 MG/1
TABLET ORAL
Qty: 180 TABLET | Refills: 3 | OUTPATIENT
Start: 2023-08-31

## 2023-09-01 ENCOUNTER — TREATMENT (OUTPATIENT)
Age: 78
End: 2023-09-01

## 2023-09-01 DIAGNOSIS — M62.81 MUSCLE WEAKNESS (GENERALIZED): Primary | ICD-10-CM

## 2023-09-01 DIAGNOSIS — M25.611 STIFFNESS OF RIGHT SHOULDER JOINT: ICD-10-CM

## 2023-09-01 DIAGNOSIS — G89.29 CHRONIC RIGHT SHOULDER PAIN: ICD-10-CM

## 2023-09-01 DIAGNOSIS — M25.511 CHRONIC RIGHT SHOULDER PAIN: ICD-10-CM

## 2023-09-01 NOTE — PROGRESS NOTES
[facility] Kaleigh Holman  [address] [place] [address]  Dept: [phone]      Physical Therapy Daily Note     Referring MD: CANDIS Blanco  Diagnosis: R Reverse TSA    ICD-10-CM    1. Muscle weakness (generalized)  M62.81       2. Stiffness of right shoulder joint  M25.611       3. Chronic right shoulder pain  M25.511     G89.29              Surgery: R reverse TSA  Date: 5/8/2023  Therapy precautions:Latex allergy   Co-morbidities affecting plan of care: Parkinson's Disease, Osteoporosis, L hip pain- using rollator for balance     Total Timed Codes: 45 min, Total Treatment Time: 45 min  Modifier needed: No    Episode visit count:  10     PERTINENT MEDICAL HISTORY     Past medical and surgical history:   Past Medical History:   Diagnosis Date    Allergic rhinitis     Anxiety     AR (allergic rhinitis)     Depression     Headache(784.0)     History of osteoporosis 8/20/2015    Overview:  12/14 BMD osteopenia  Last Assessment & Plan:  Continue current regimen     Hypercholesterolemia     Hypertension     Insomnia     Left arm pain 2/3/2016    Migraine without status migrainosus, not intractable     Osteoarthritis     Osteoporosis     Parkinson's disease (HCC)     PONV (postoperative nausea and vomiting)     RLS (restless legs syndrome)     Tremor       Past Surgical History:   Procedure Laterality Date    COLONOSCOPY      several    EXTRAC ERUPTED TOOTH/EXPOSED ROOT      SHOULDER SURGERY Right 5/8/2023    RIGHT SHOULDER TOTAL ARTHROPLASTY REVERSE -REGIONAL BLOCK -23 HOUR OBSERVATION performed by Janet Bean MD at [address]     Medications: reviewed in chart   Allergies:    Allergies   Allergen Reactions    Latex     Sulfa Antibiotics Nausea And Vomiting and Nausea Only        Diagnostic exams (per chart review): no change in the hardware's alignment and the glenohumeral position is appropriate on all the films      SUBJECTIVE     Pt reports her hip is hurting her

## 2023-09-07 ENCOUNTER — PREP FOR PROCEDURE (OUTPATIENT)
Dept: ORTHOPEDIC SURGERY | Age: 78
End: 2023-09-07

## 2023-09-07 DIAGNOSIS — M16.12 PRIMARY OSTEOARTHRITIS OF LEFT HIP: Primary | ICD-10-CM

## 2023-09-07 RX ORDER — SODIUM CHLORIDE 0.9 % (FLUSH) 0.9 %
5-40 SYRINGE (ML) INJECTION PRN
Status: CANCELLED | OUTPATIENT
Start: 2023-09-07

## 2023-09-07 RX ORDER — SODIUM CHLORIDE 9 MG/ML
INJECTION, SOLUTION INTRAVENOUS PRN
Status: CANCELLED | OUTPATIENT
Start: 2023-09-07

## 2023-09-07 RX ORDER — SODIUM CHLORIDE 0.9 % (FLUSH) 0.9 %
5-40 SYRINGE (ML) INJECTION EVERY 12 HOURS SCHEDULED
Status: CANCELLED | OUTPATIENT
Start: 2023-09-07

## 2023-09-07 RX ORDER — CARBIDOPA AND LEVODOPA 50; 200 MG/1; MG/1
TABLET, EXTENDED RELEASE ORAL
Qty: 60 TABLET | Refills: 0 | OUTPATIENT
Start: 2023-09-07

## 2023-09-07 NOTE — PROGRESS NOTES
[facility] [address] [address]  Dept: [phone]      Physical Therapy Daily Note     Referring MD: CANDIS Ramirez  Diagnosis: R Reverse TSA    ICD-10-CM    1. Muscle weakness (generalized)  M62.81       2. Stiffness of right shoulder joint  M25.611       3. Chronic right shoulder pain  M25.511     G89.29                Surgery: R reverse TSA  Date: 5/8/2023  Therapy precautions:Latex allergy   Co-morbidities affecting plan of care: Parkinson's Disease, Osteoporosis, L hip pain- using rollator for balance     Total Timed Codes: 48 min, Total Treatment Time: 50 min  Modifier needed: No    Episode visit count:  11     PERTINENT MEDICAL HISTORY     Past medical and surgical history:   Past Medical History:   Diagnosis Date    Allergic rhinitis     Anxiety     AR (allergic rhinitis)     Depression     Headache(784.0)     History of osteoporosis 8/20/2015    Overview:  12/14 BMD osteopenia  Last Assessment & Plan:  Continue current regimen     Hypercholesterolemia     Hypertension     Insomnia     Left arm pain 2/3/2016    Migraine without status migrainosus, not intractable     Osteoarthritis     Osteoporosis     Parkinson's disease (HCC)     PONV (postoperative nausea and vomiting)     RLS (restless legs syndrome)     Tremor       Past Surgical History:   Procedure Laterality Date    COLONOSCOPY      several    EXTRAC ERUPTED TOOTH/EXPOSED ROOT      SHOULDER SURGERY Right 5/8/2023    RIGHT SHOULDER TOTAL ARTHROPLASTY REVERSE -REGIONAL BLOCK -23 HOUR OBSERVATION performed by Anahy Adams MD at [address]     Medications: reviewed in chart   Allergies:    Allergies   Allergen Reactions    Latex     Sulfa Antibiotics Nausea And Vomiting and Nausea Only        Diagnostic exams (per chart review): no change in the hardware's alignment and the glenohumeral position is appropriate on all the films      SUBJECTIVE     Pt reports that she gets

## 2023-09-08 ENCOUNTER — TREATMENT (OUTPATIENT)
Age: 78
End: 2023-09-08

## 2023-09-08 DIAGNOSIS — M62.81 MUSCLE WEAKNESS (GENERALIZED): Primary | ICD-10-CM

## 2023-09-08 DIAGNOSIS — M25.511 CHRONIC RIGHT SHOULDER PAIN: ICD-10-CM

## 2023-09-08 DIAGNOSIS — M25.611 STIFFNESS OF RIGHT SHOULDER JOINT: ICD-10-CM

## 2023-09-08 DIAGNOSIS — G89.29 CHRONIC RIGHT SHOULDER PAIN: ICD-10-CM

## 2023-09-12 ENCOUNTER — TREATMENT (OUTPATIENT)
Age: 78
End: 2023-09-12
Payer: MEDICARE

## 2023-09-12 DIAGNOSIS — M25.611 STIFFNESS OF RIGHT SHOULDER JOINT: ICD-10-CM

## 2023-09-12 DIAGNOSIS — M25.612 SHOULDER STIFFNESS, LEFT: ICD-10-CM

## 2023-09-12 DIAGNOSIS — G89.29 CHRONIC RIGHT SHOULDER PAIN: ICD-10-CM

## 2023-09-12 DIAGNOSIS — M25.512 CHRONIC LEFT SHOULDER PAIN: ICD-10-CM

## 2023-09-12 DIAGNOSIS — G89.29 CHRONIC LEFT SHOULDER PAIN: ICD-10-CM

## 2023-09-12 DIAGNOSIS — M62.81 MUSCLE WEAKNESS (GENERALIZED): Primary | ICD-10-CM

## 2023-09-12 DIAGNOSIS — M25.511 CHRONIC RIGHT SHOULDER PAIN: ICD-10-CM

## 2023-09-12 PROCEDURE — 97110 THERAPEUTIC EXERCISES: CPT | Performed by: PHYSICAL THERAPIST

## 2023-09-12 NOTE — PROGRESS NOTES
glenohumeral position is appropriate on all the films      SUBJECTIVE     Pt states left TONY scheduled 9/27/23. Overall right shoulder ROM improving but remains limited by pain and weakness at [facility]. OBJECTIVE      8/28/23: AROM Measures:  Right shoulder flexion =  85 deg, abd = 74 deg  AAROM in supine, flexion 135      Treatment provided today consisted of: Therapeutic exercise (46053) x 45 min to develop ROM/strength of the involved shoulder and to develop an initial HEP as noted below. Exercises  - UBE x 5 minutes, (2.5 min each) counter and counterclockwise   - OH pulley for shoulder flex, abduction and scaption x 15  each seated  - seated flexion with UE ranger on floor 2x10, YTB x20  - seated scaption with UE ranger on floor 2x10  - seated ER B with YTB 2x8  - seated row with RTB 2x10  Seated pull down with RTB 2x10  Active shoulder abd in sitting  Deltoid isometrics in sitting  Seated scapular retraction with sliding hands up her thighs  Standing and pushing down on small ball- added to HEP  Standing passing ball in clockwise/counterclockwise around body 2x10- added to HEP  Standing active assisted shoulder flex while holding ball  Standing active assisted shoulder punchouts with ball   - Time spent talking with pt about functional expectations following rTSA and the proper way to transfer sit to stand, to push up and not pull herself up with rollator. Patient to ice at home      ASSESSMENT     Pt with good tolerance to treatment. Cuing to avoid scapular elevation for AAROM. Pt requires cuing for technique. PLAN      Continue to work on ROM- progressing AAROM and strengthening. Caution with positioning d/t left hip pain. Consider sidelying shoulder flex, abd and Ext if left hip allows. Updated HEP and issue to pt for home program while recovering from hip surgery.       GOALS     Short term goals to be met by 9/10/23  Pt will demonstrate good recall of HEP requiring minimal verbal cuing for proper

## 2023-09-13 ENCOUNTER — HOSPITAL ENCOUNTER (OUTPATIENT)
Dept: SURGERY | Age: 78
Discharge: HOME | End: 2023-09-16
Payer: MEDICARE

## 2023-09-13 VITALS
HEART RATE: 57 BPM | TEMPERATURE: 97.7 F | WEIGHT: 146.39 LBS | BODY MASS INDEX: 22.98 KG/M2 | DIASTOLIC BLOOD PRESSURE: 68 MMHG | SYSTOLIC BLOOD PRESSURE: 146 MMHG | HEIGHT: 67 IN | OXYGEN SATURATION: 96 % | RESPIRATION RATE: 16 BRPM

## 2023-09-13 DIAGNOSIS — M16.12 PRIMARY OSTEOARTHRITIS OF LEFT HIP: ICD-10-CM

## 2023-09-13 LAB
ALBUMIN SERPL-MCNC: 3.4 G/DL (ref 3.2–4.6)
ANION GAP SERPL CALC-SCNC: 5 MMOL/L (ref 2–11)
APTT PPP: 31 SEC (ref 24.5–34.2)
BASOPHILS # BLD: 0 K/UL (ref 0–0.2)
BASOPHILS NFR BLD: 0 % (ref 0–2)
BUN SERPL-MCNC: 18 MG/DL (ref 8–23)
CALCIUM SERPL-MCNC: 9.1 MG/DL (ref 8.3–10.4)
CHLORIDE SERPL-SCNC: 110 MMOL/L (ref 101–110)
CO2 SERPL-SCNC: 26 MMOL/L (ref 21–32)
CREAT SERPL-MCNC: 0.45 MG/DL (ref 0.6–1)
DIFFERENTIAL METHOD BLD: ABNORMAL
EOSINOPHIL # BLD: 0.1 K/UL (ref 0–0.8)
EOSINOPHIL NFR BLD: 3 % (ref 0.5–7.8)
ERYTHROCYTE [DISTWIDTH] IN BLOOD BY AUTOMATED COUNT: 18.6 % (ref 11.9–14.6)
EST. AVERAGE GLUCOSE BLD GHB EST-MCNC: 134 MG/DL
GLUCOSE SERPL-MCNC: 101 MG/DL (ref 65–100)
HBA1C MFR BLD: 6.3 % (ref 4.8–5.6)
HCT VFR BLD AUTO: 33 % (ref 35.8–46.3)
HGB BLD-MCNC: 10.3 G/DL (ref 11.7–15.4)
IMM GRANULOCYTES # BLD AUTO: 0 K/UL (ref 0–0.5)
IMM GRANULOCYTES NFR BLD AUTO: 0 % (ref 0–5)
INR PPP: 1.1
LYMPHOCYTES # BLD: 1.2 K/UL (ref 0.5–4.6)
LYMPHOCYTES NFR BLD: 23 % (ref 13–44)
MCH RBC QN AUTO: 25.8 PG (ref 26.1–32.9)
MCHC RBC AUTO-ENTMCNC: 31.2 G/DL (ref 31.4–35)
MCV RBC AUTO: 82.5 FL (ref 82–102)
MONOCYTES # BLD: 0.4 K/UL (ref 0.1–1.3)
MONOCYTES NFR BLD: 9 % (ref 4–12)
MRSA DNA SPEC QL NAA+PROBE: NOT DETECTED
NEUTS SEG # BLD: 3.3 K/UL (ref 1.7–8.2)
NEUTS SEG NFR BLD: 65 % (ref 43–78)
NRBC # BLD: 0 K/UL (ref 0–0.2)
PLATELET # BLD AUTO: 325 K/UL (ref 150–450)
PMV BLD AUTO: 10.1 FL (ref 9.4–12.3)
POTASSIUM SERPL-SCNC: 4.2 MMOL/L (ref 3.5–5.1)
PROTHROMBIN TIME: 13.7 SEC (ref 12.6–14.3)
RBC # BLD AUTO: 4 M/UL (ref 4.05–5.2)
S AUREUS CPE NOSE QL NAA+PROBE: NOT DETECTED
SODIUM SERPL-SCNC: 141 MMOL/L (ref 133–143)
WBC # BLD AUTO: 5 K/UL (ref 4.3–11.1)

## 2023-09-13 PROCEDURE — 80307 DRUG TEST PRSMV CHEM ANLYZR: CPT

## 2023-09-13 PROCEDURE — 85610 PROTHROMBIN TIME: CPT

## 2023-09-13 PROCEDURE — 82040 ASSAY OF SERUM ALBUMIN: CPT

## 2023-09-13 PROCEDURE — 85025 COMPLETE CBC W/AUTO DIFF WBC: CPT

## 2023-09-13 PROCEDURE — 80048 BASIC METABOLIC PNL TOTAL CA: CPT

## 2023-09-13 PROCEDURE — 87641 MR-STAPH DNA AMP PROBE: CPT

## 2023-09-13 PROCEDURE — 85730 THROMBOPLASTIN TIME PARTIAL: CPT

## 2023-09-13 PROCEDURE — 83036 HEMOGLOBIN GLYCOSYLATED A1C: CPT

## 2023-09-13 RX ORDER — OXYCODONE AND ACETAMINOPHEN 7.5; 325 MG/1; MG/1
1 TABLET ORAL EVERY 4 HOURS PRN
COMMUNITY

## 2023-09-13 RX ORDER — TRAMADOL HYDROCHLORIDE 50 MG/1
50 TABLET ORAL EVERY 6 HOURS PRN
COMMUNITY

## 2023-09-13 RX ORDER — MELOXICAM 7.5 MG/1
7.5 TABLET ORAL AS NEEDED
COMMUNITY

## 2023-09-13 RX ORDER — CETIRIZINE HYDROCHLORIDE 10 MG/1
10 TABLET ORAL DAILY
COMMUNITY

## 2023-09-13 ASSESSMENT — PAIN DESCRIPTION - ORIENTATION: ORIENTATION: LEFT

## 2023-09-13 ASSESSMENT — PAIN SCALES - GENERAL: PAINLEVEL_OUTOF10: 5

## 2023-09-13 ASSESSMENT — PAIN DESCRIPTION - LOCATION: LOCATION: HIP

## 2023-09-13 NOTE — PROGRESS NOTES
PLEASE CONTINUE TAKING ALL PRESCRIPTION MEDICATIONS UP TO THE DAY OF SURGERY UNLESS OTHERWISE DIRECTED BELOW. DISCONTINUE all vitamins, herbals and supplements 3 weeks prior to surgery. DISCONTINUE Non-Steriodal Anti-Inflammatory (NSAIDS) such as Advil, Ibuprofen, Motrin, Naproxen and Aleve 5 days prior to surgery. Home Medications to take  the day of surgery    Escitalopram                       Cetirizine   Sinemet    Rasagiline     Home Medications   to Hold   HOLD Meloxicam for 5 days prior to surgery        Comments   On the day before surgery please take Acetaminophen 1000mg in the morning and then again before bed. You may substitute for Tylenol 650 mg.           Bring Dynahex wash and Incentive Spirometer with you to hospital on the day of surgery. Please do not bring home medications with you on the day of surgery unless otherwise directed by your nurse. If you are instructed to bring home medications, please give them to your nurse as they will be administered by the nursing staff. If you have any questions, please call [address] [place] [address] [phone]. A copy of this note was provided to the patient for reference.

## 2023-09-13 NOTE — PROGRESS NOTES
[facility] Brown Fili  [address] [place] [address]  Dept: [phone]      Physical Therapy Discharge Note     Referring MD: CANDIS Finn  Diagnosis: R Reverse TSA    ICD-10-CM    1. Muscle weakness (generalized)  M62.81       2. Stiffness of right shoulder joint  M25.611       3. Chronic right shoulder pain  M25.511     G89.29       4. Impaired functional mobility and activity tolerance  Z74.09                Surgery: R reverse TSA  Date: 2023  Therapy precautions:Latex allergy   Co-morbidities affecting plan of care: Parkinson's Disease, Osteoporosis, L hip pain- using rollator for balance     Total Timed Codes: 45 min, Total Treatment Time: 45 min  Modifier needed: KX needed    Episode visit count:  13     PERTINENT MEDICAL HISTORY     Past medical and surgical history:   Past Medical History:   Diagnosis Date    Allergic rhinitis     Anxiety     AR (allergic rhinitis)     Depression     Headache(784.0)     History of blood transfusion     History of osteoporosis 2015    Overview:   BMD osteopenia  Last Assessment & Plan:  Continue current regimen     Hypercholesterolemia     Hypertension     Insomnia     Left arm pain 2/3/2016    Migraine without status migrainosus, not intractable     Osteoarthritis     Osteoporosis     Parkinson's disease (HCC)     PONV (postoperative nausea and vomiting)     RLS (restless legs syndrome)     Spinal headache     after first     Tremor       Past Surgical History:   Procedure Laterality Date    CATARACT EXTRACTION EXTRACAPSULAR W/ INTRAOCULAR LENS IMPLANTATION Bilateral     COLONOSCOPY      several    EXTRAC ERUPTED TOOTH/EXPOSED ROOT      SHOULDER SURGERY Right 2023    RIGHT SHOULDER TOTAL ARTHROPLASTY REVERSE -REGIONAL BLOCK -23 HOUR OBSERVATION performed by Pete Cabello MD at [address]     Medications: reviewed in chart   Allergies:    Allergies   Allergen Reactions    Latex Other

## 2023-09-13 NOTE — PROGRESS NOTES
Latest Reference Range & Units 09/13/23 09:41   Sodium 133 - 143 mmol/L 141   Potassium 3.5 - 5.1 mmol/L 4.2   Chloride 101 - 110 mmol/L 110   CO2 21 - 32 mmol/L 26   BUN,BUNPL 8 - 23 MG/DL 18   Creatinine 0.6 - 1.0 MG/DL 0.45 (L)   Anion Gap 2 - 11 mmol/L 5   Est, Glom Filt Rate >60 ml/min/1.73m2 >60   Glucose, Random 65 - 100 mg/dL 101 (H)   CALCIUM, SERUM, [id number] 8.3 - 10.4 MG/DL 9.1   Albumin 3.2 - 4.6 g/dL 3.4   Hemoglobin A1C 4.8 - 5.6 % 6.3 (H)   eAG (mg/dL) mg/dL 134   WBC 4.3 - 11.1 K/uL 5.0   RBC 4.05 - 5.2 M/uL 4.00 (L)   Hemoglobin Quant 11.7 - 15.4 g/dL 10.3 (L)   Hematocrit 35.8 - 46.3 % 33.0 (L)   MCV 82.0 - 102.0 FL 82.5   MCH 26.1 - 32.9 PG 25.8 (L)   MCHC 31.4 - 35.0 g/dL 31.2 (L)   MPV 9.4 - 12.3 FL 10.1   RDW 11.9 - 14.6 % 18.6 (H)   Platelet Count 256 - 450 K/uL 325   Neutrophils % 43 - 78 % 65   Lymphocyte % 13 - 44 % 23   Monocytes % 4.0 - 12.0 % 9   Eosinophils % 0.5 - 7.8 % 3   Basophils % 0.0 - 2.0 % 0   Neutrophils Absolute 1.7 - 8.2 K/UL 3.3   Lymphocytes Absolute 0.5 - 4.6 K/UL 1.2   Monocytes Absolute 0.1 - 1.3 K/UL 0.4   Eosinophils Absolute 0.0 - 0.8 K/UL 0.1   Basophils Absolute 0.0 - 0.2 K/UL 0.0   Differential Type -   AUTOMATED   Immature Granulocytes 0.0 - 5.0 % 0   Nucleated Red Blood Cells 0.0 - 0.2 K/uL 0.00   Absolute Immature Granulocyte 0.0 - 0.5 K/UL 0.0   Prothrombin Time 12.6 - 14.3 sec 13.7   INR -   1.1   PTT 24.5 - 34.2 SEC 31.0   (L): Data is abnormally low  (H): Data is abnormally high

## 2023-09-13 NOTE — PROGRESS NOTES
Total Joint Surgery Preoperative Chart Review      Patient ID:  Liz Suggs  [id number]  5 y.o.  1945  Surgeon: Dr. Sergo Riley  Date of Surgery: 2023  Procedure: Total Left Hip Arthroplasty  Primary Care Physician: Jonathon Barnes [phone]  Specialty Physician(s):      Subjective:   Liz Suggs is a 77 y.o. White (non-) female who presents for preoperative evaluation for Total Left Hip arthroplasty. This is a preoperative chart review note based on data collected by the nurse at the surgical Pre-Assessment visit.     Past Medical History:   Diagnosis Date    Allergic rhinitis     Anxiety     AR (allergic rhinitis)     Depression     Headache(784.0)     History of blood transfusion     History of osteoporosis 2015    Overview:   BMD osteopenia  Last Assessment & Plan:  Continue current regimen     Hypercholesterolemia     Hypertension     Insomnia     Left arm pain 2/3/2016    Migraine without status migrainosus, not intractable     Osteoarthritis     Osteoporosis     Parkinson's disease ([address])     PONV (postoperative nausea and vomiting)     RLS (restless legs syndrome)     Spinal headache     after first     Tremor       Past Surgical History:   Procedure Laterality Date    CATARACT EXTRACTION EXTRACAPSULAR W/ INTRAOCULAR LENS IMPLANTATION Bilateral     COLONOSCOPY      several    EXTRAC ERUPTED TOOTH/EXPOSED ROOT      SHOULDER SURGERY Right 2023    RIGHT SHOULDER TOTAL ARTHROPLASTY REVERSE -REGIONAL BLOCK -23 HOUR OBSERVATION performed by Pete Cabello MD at [address]     Family History   Problem Relation Age of Onset    Heart Disease Mother     High Blood Pressure Mother     Other Mother         Paget's Dz    Hypertension Mother     Heart Disease Father     Cancer Father         Bladder Ca    Depression Father     Dementia Father     Stroke Father     Suicide Father     Diabetes Maternal Grandmother       Social History

## 2023-09-13 NOTE — PROGRESS NOTES
Patient verified name and . Order for consent  found in EHR and matches case postig; patient verified. Type 3 surgery, walk in assessment complete. Labs per surgeon: CBC,BMP, PT/PTT, Albumin, Hgb A1c ; results within anesthesia guidelines; urine nicotine result pending  Labs per anesthesia protocol: no additional  EKG:completed 23 and within anesthesia guidelines. Available in Chart Review for reference. MRSA/MSSA swab collected; pharmacy to review and dose antibiotic as appropriate. Hospital approved surgical skin cleanser and instructions to return bottle on DOS given per hospital policy. Patient provided with handouts including Guide to Surgery, Pain Management, Hand Hygiene, Blood Transfusion Education, and [facility] Brochure. Patient answered medical/surgical history questions at their best of ability. All prior to admission medications documented in [facility]. Original medication prescription bottle  visualized during patient appointment. Patient instructed to hold all vitamins 3 weeks prior to surgery and NSAIDS 5 days prior to surgery. Patient teach back successful and patient demonstrates knowledge of instruction.

## 2023-09-14 DIAGNOSIS — G89.18 POSTOPERATIVE PAIN: Primary | ICD-10-CM

## 2023-09-14 LAB
COMMENT:: NORMAL
COTININE UR QL SCN: NEGATIVE NG/ML

## 2023-09-14 RX ORDER — OXYCODONE HYDROCHLORIDE 5 MG/1
10 TABLET ORAL EVERY 4 HOURS PRN
Qty: 60 TABLET | Refills: 0 | Status: SHIPPED | OUTPATIENT
Start: 2023-09-14 | End: 2023-09-21

## 2023-09-14 RX ORDER — ACETAMINOPHEN 500 MG
1000 TABLET ORAL EVERY 6 HOURS PRN
Qty: 180 TABLET | Refills: 2 | Status: SHIPPED | OUTPATIENT
Start: 2023-09-14

## 2023-09-14 RX ORDER — ASPIRIN 81 MG/1
81 TABLET ORAL 2 TIMES DAILY
Qty: 60 TABLET | Refills: 0 | Status: SHIPPED | OUTPATIENT
Start: 2023-09-14

## 2023-09-14 RX ORDER — SENNOSIDES A AND B 8.6 MG/1
1 TABLET, FILM COATED ORAL 2 TIMES DAILY
Qty: 30 TABLET | Refills: 2 | Status: SHIPPED | OUTPATIENT
Start: 2023-09-14

## 2023-09-14 RX ORDER — OMEPRAZOLE 40 MG/1
40 CAPSULE, DELAYED RELEASE ORAL DAILY
Qty: 30 CAPSULE | Refills: 0 | Status: SHIPPED | OUTPATIENT
Start: 2023-09-14

## 2023-09-14 RX ORDER — ONDANSETRON 4 MG/1
4 TABLET, FILM COATED ORAL 3 TIMES DAILY PRN
Qty: 30 TABLET | Refills: 1 | Status: SHIPPED | OUTPATIENT
Start: 2023-09-14

## 2023-09-15 ENCOUNTER — TREATMENT (OUTPATIENT)
Age: 78
End: 2023-09-15
Payer: MEDICARE

## 2023-09-15 DIAGNOSIS — M25.511 CHRONIC RIGHT SHOULDER PAIN: ICD-10-CM

## 2023-09-15 DIAGNOSIS — G89.29 CHRONIC RIGHT SHOULDER PAIN: ICD-10-CM

## 2023-09-15 DIAGNOSIS — Z74.09 IMPAIRED FUNCTIONAL MOBILITY AND ACTIVITY TOLERANCE: ICD-10-CM

## 2023-09-15 DIAGNOSIS — M25.611 STIFFNESS OF RIGHT SHOULDER JOINT: ICD-10-CM

## 2023-09-15 DIAGNOSIS — M62.81 MUSCLE WEAKNESS (GENERALIZED): Primary | ICD-10-CM

## 2023-09-15 PROCEDURE — 97110 THERAPEUTIC EXERCISES: CPT | Performed by: PHYSICAL THERAPIST

## 2023-09-18 ENCOUNTER — TELEPHONE (OUTPATIENT)
Dept: NEUROLOGY | Age: 78
End: 2023-09-18

## 2023-09-18 NOTE — TELEPHONE ENCOUNTER
Patient called and left a message to advise Dr. Sohail Wang that she currently has ankle swelling and her blood pressure has averaged around 166/46. She is also going for surgery for her hip on 9/27/23.

## 2023-09-20 ENCOUNTER — HOSPITAL ENCOUNTER (EMERGENCY)
Age: 78
Discharge: HOME | End: 2023-09-20
Attending: EMERGENCY MEDICINE
Payer: MEDICARE

## 2023-09-20 ENCOUNTER — HOSPITAL ENCOUNTER (OUTPATIENT)
Dept: ULTRASOUND IMAGING | Age: 78
Discharge: HOME | End: 2023-09-22
Payer: MEDICARE

## 2023-09-20 ENCOUNTER — TELEPHONE (OUTPATIENT)
Dept: ORTHOPEDIC SURGERY | Age: 78
End: 2023-09-20

## 2023-09-20 ENCOUNTER — TRANSCRIBE ORDERS (OUTPATIENT)
Dept: SCHEDULING | Age: 78
End: 2023-09-20

## 2023-09-20 VITALS
OXYGEN SATURATION: 98 % | BODY MASS INDEX: 22.76 KG/M2 | SYSTOLIC BLOOD PRESSURE: 130 MMHG | HEART RATE: 62 BPM | TEMPERATURE: 97.2 F | DIASTOLIC BLOOD PRESSURE: 64 MMHG | RESPIRATION RATE: 18 BRPM | HEIGHT: 67 IN | WEIGHT: 145 LBS

## 2023-09-20 DIAGNOSIS — M79.89 SWELLING OF LEFT LOWER EXTREMITY: Primary | ICD-10-CM

## 2023-09-20 DIAGNOSIS — R79.89 ELEVATED D-DIMER: ICD-10-CM

## 2023-09-20 DIAGNOSIS — I82.412 ACUTE DEEP VEIN THROMBOSIS (DVT) OF FEMORAL VEIN OF LEFT LOWER EXTREMITY (HCC): Primary | ICD-10-CM

## 2023-09-20 DIAGNOSIS — M79.89 SWELLING OF LEFT LOWER EXTREMITY: ICD-10-CM

## 2023-09-20 PROCEDURE — 93971 EXTREMITY STUDY: CPT

## 2023-09-20 PROCEDURE — 99283 EMERGENCY DEPT VISIT LOW MDM: CPT

## 2023-09-20 ASSESSMENT — PAIN SCALES - GENERAL: PAINLEVEL_OUTOF10: 4

## 2023-09-20 ASSESSMENT — PAIN - FUNCTIONAL ASSESSMENT: PAIN_FUNCTIONAL_ASSESSMENT: 0-10

## 2023-09-20 NOTE — TELEPHONE ENCOUNTER
I called and spoke to patient. I let her know that I will talk to Dr Aurora Harris tomorrow morning and I will give her a call tomorrow afternoon.

## 2023-09-20 NOTE — DISCHARGE INSTRUCTIONS
Use blood thinners as directed. Avoid any climbing avoid falls.   Call your primary care office to arrange appointment in 1 week call your orthopedic office today to inform of DVT

## 2023-09-20 NOTE — ED TRIAGE NOTES
Pt arrives for positive DVT study I left lower extremity. Is not on [address] . Reports swelling has been present for 10 days. Denies SOB or angina at this time.

## 2023-09-20 NOTE — ED PROVIDER NOTES
glucosamine-chondroitin 750-600 MG TABS tablet Take by mouth dailyHistorical Med      lactase (LACTAID) 3000 units tablet Take 1 tablet by mouth as neededHistorical Med      melatonin 3 MG TABS tablet Take by mouthHistorical Med      Simethicone 125 MG CAPS Take 1 capsule by mouth 4 times daily as neededHistorical Med      SUMAtriptan (IMITREX) 50 MG tablet Take 1 tablet by mouth once as neededHistorical Med       !! - Potential duplicate medications found. Please discuss with provider. Results for orders placed or performed during the hospital encounter of 09/20/23   Vascular duplex lower extremity venous left    Narrative    LEFT LOWER EXTREMITY DOPPLER ULTRASOUND 9/20/2023    HISTORY:    DVT Hx    Technique: Sonographic imaging of the deep veins of the left leg was performed    FINDINGS: Noncompressible deep venous thrombus is present in the left mid to  distal femoral vein. The remaining deep veins of left leg are patent, demonstrating normal venous  compressibility and intraluminal Doppler flow. Impression    Positive DVT in the left femoral vein. Findings were phoned to the ordering physician's office by the radiology  navigator. No orders to display                     Voice dictation software was used during the making of this note. This software is not perfect and grammatical and other typographical errors may be present. This note has not been completely proofread for errors.       CANDIS Armas  09/20/23 700 [facility]  09/20/23 2533

## 2023-09-20 NOTE — TELEPHONE ENCOUNTER
She is scheduled for surgery Sept 27 for hip replacement. She found out today that she has a blood clot in her leg. Her doctor at [facility] ordered the ultrasound. She is waiting to hear from them about starting blood thinners. She wants to know what this means as far as her surgery. Her  wants to know if she can get an inj because she is in so much pain if her surgery has to be postponed. She is asking for a call today.

## 2023-09-21 ENCOUNTER — TELEPHONE (OUTPATIENT)
Dept: ORTHOPEDIC SURGERY | Age: 78
End: 2023-09-21

## 2023-09-21 NOTE — TELEPHONE ENCOUNTER
Pts daughter called and wants the sx rescheduled because the schedule is out so far. She states the blood clot happened because she had to wait so long for sx.  Please call her [phone]

## 2023-09-21 NOTE — TELEPHONE ENCOUNTER
I called and spoke to patient and patients spouse. I let them know that we are going to have to cancel her surgery due to the blood clot that she has been diagnosed with. Patient verbalized understanding.

## 2023-09-29 ENCOUNTER — TELEPHONE (OUTPATIENT)
Dept: ORTHOPEDIC SURGERY | Age: 78
End: 2023-09-29

## 2023-09-29 NOTE — TELEPHONE ENCOUNTER
Dtr  Lisa Orellana  called  about  her mothers  sx  being  r/s     Call lolis  at [phone]    And then try the  pt  if  you do not  reach  Lisa Orellana

## 2023-10-05 ENCOUNTER — TELEPHONE (OUTPATIENT)
Dept: NEUROLOGY | Age: 78
End: 2023-10-05

## 2023-10-05 NOTE — TELEPHONE ENCOUNTER
Patient called asking if you would know if there is any reason her shoulders hurt and ache in the morning

## 2023-10-06 ENCOUNTER — TELEPHONE (OUTPATIENT)
Dept: ORTHOPEDIC SURGERY | Age: 78
End: 2023-10-06

## 2023-10-06 NOTE — TELEPHONE ENCOUNTER
I called and spoke to patients daughter, Sindhu Angelo. I informed her that our protocol is we have to have a clearance from the provider that diagnosed the patient with the the DVT before we can reschedule the patients surgery.

## 2023-10-09 ENCOUNTER — OFFICE VISIT (OUTPATIENT)
Dept: ORTHOPEDIC SURGERY | Age: 78
End: 2023-10-09
Payer: MEDICARE

## 2023-10-09 DIAGNOSIS — M19.011 PRIMARY OSTEOARTHRITIS, RIGHT SHOULDER: Primary | ICD-10-CM

## 2023-10-09 DIAGNOSIS — Z09 SURGERY FOLLOW-UP: ICD-10-CM

## 2023-10-09 PROCEDURE — G8427 DOCREV CUR MEDS BY ELIG CLIN: HCPCS | Performed by: PHYSICIAN ASSISTANT

## 2023-10-09 PROCEDURE — G8484 FLU IMMUNIZE NO ADMIN: HCPCS | Performed by: PHYSICIAN ASSISTANT

## 2023-10-09 PROCEDURE — G8399 PT W/DXA RESULTS DOCUMENT: HCPCS | Performed by: PHYSICIAN ASSISTANT

## 2023-10-09 PROCEDURE — G8420 CALC BMI NORM PARAMETERS: HCPCS | Performed by: PHYSICIAN ASSISTANT

## 2023-10-09 PROCEDURE — 99213 OFFICE O/P EST LOW 20 MIN: CPT | Performed by: PHYSICIAN ASSISTANT

## 2023-10-09 PROCEDURE — 1090F PRES/ABSN URINE INCON ASSESS: CPT | Performed by: PHYSICIAN ASSISTANT

## 2023-10-09 PROCEDURE — 1123F ACP DISCUSS/DSCN MKR DOCD: CPT | Performed by: PHYSICIAN ASSISTANT

## 2023-10-09 PROCEDURE — 1036F TOBACCO NON-USER: CPT | Performed by: PHYSICIAN ASSISTANT

## 2023-10-09 NOTE — PROGRESS NOTES
5. Belly Press Test: 5  The swelling is minimal. They are neurovascularly intact. X-rayCira Farheen and axillary lateral views of the operative right shoulder were obtained and reviewed today in the office. There has been no change in the hardware's alignment and the glenohumeral position is appropriate on all the films. A/P:     ICD-10-CM    1. Primary osteoarthritis, right shoulder  M19.011 XR SHOULDER RIGHT (MIN 2 VIEWS)      2. Surgery follow-up  Z09 XR SHOULDER RIGHT (MIN 2 VIEWS)        The patient is doing well status post the above mentioned procedure. They need to continue with Physical Therapy if they have not transitioned to a HEP. Discussed the importance of continued stretching and strengthening. If they have any questions or concerns the patient knows that they can call our office at any time. Return in about 7 months (around 5/9/2024).      Mariah Alberts  10/09/23

## 2023-11-06 ENCOUNTER — TELEPHONE (OUTPATIENT)
Dept: ORTHOPEDIC SURGERY | Age: 78
End: 2023-11-06

## 2023-11-16 NOTE — PROGRESS NOTES
11/17/23  Dorthey Lesches        Chief Complaint:  Chief Complaint   Patient presents with    Follow-up       Parkinson disease          Parkinson's Disease Diagnosed: 2014 with left hand tremor and balance issues      HPI:   Dorthey Lesches, 77 y.o.,female here in clinic follow up for continued management of Parkinson's Disease. She could not have her left hip replacement due to a blood clot on left thigh and being on xarelto. Once it is clear she can have the surgery. Using a walker now for balance due to hip issues. Sinemet is not wearing off but not always getting her dose of time. She is not taking melatonin regularly. She denies RBD. She is having a hard tome to get going in the AM a lot. Constipation if she does not eat the right things. She takes a probiotic. Current Neuro related meds:  Sinemet 25/100 mg 2 tabs TID (0600, 1200, and 600)  Sinemet CR 50/200 mg 1 tabs QHS  Azilect 1 mg daily  Mirapex 0.5mg 1 at 6pm and 2 at bedtime  Melatonin 10mg           Review of Systems   Constitutional:  Negative for appetite change. HENT:  Positive for tinnitus. Negative for hearing loss. Eyes:  Negative for visual disturbance. Gastrointestinal:  Positive for constipation. Musculoskeletal:  Positive for neck pain. Negative for back pain. Neurological:  Negative for dizziness, tremors, seizures, speech difficulty, numbness and headaches. Psychiatric/Behavioral:  Positive for sleep disturbance. Negative for hallucinations. The patient is not nervous/anxious.            Past Medical History:   Diagnosis Date    Allergic rhinitis     Anxiety     AR (allergic rhinitis)     Depression     Headache(784.0)     History of blood transfusion 1948    History of osteoporosis 8/20/2015    Overview:  12/14 BMD osteopenia  Last Assessment & Plan:  Continue current regimen     Hypercholesterolemia     Hypertension     Insomnia     Left arm pain 2/3/2016    Migraine without status migrainosus,

## 2023-11-17 ENCOUNTER — OFFICE VISIT (OUTPATIENT)
Dept: NEUROLOGY | Age: 78
End: 2023-11-17
Payer: MEDICARE

## 2023-11-17 VITALS
HEART RATE: 68 BPM | WEIGHT: 148 LBS | DIASTOLIC BLOOD PRESSURE: 79 MMHG | OXYGEN SATURATION: 97 % | SYSTOLIC BLOOD PRESSURE: 122 MMHG | BODY MASS INDEX: 23.18 KG/M2

## 2023-11-17 DIAGNOSIS — R25.1 TREMOR: ICD-10-CM

## 2023-11-17 DIAGNOSIS — G20.A2 PARKINSON'S DISEASE WITHOUT DYSKINESIA, WITH FLUCTUATING MANIFESTATIONS: Primary | ICD-10-CM

## 2023-11-17 DIAGNOSIS — Z79.899 ENCOUNTER FOR LONG-TERM (CURRENT) USE OF HIGH-RISK MEDICATION: ICD-10-CM

## 2023-11-17 PROCEDURE — G8484 FLU IMMUNIZE NO ADMIN: HCPCS | Performed by: PSYCHIATRY & NEUROLOGY

## 2023-11-17 PROCEDURE — G8399 PT W/DXA RESULTS DOCUMENT: HCPCS | Performed by: PSYCHIATRY & NEUROLOGY

## 2023-11-17 PROCEDURE — G8420 CALC BMI NORM PARAMETERS: HCPCS | Performed by: PSYCHIATRY & NEUROLOGY

## 2023-11-17 PROCEDURE — 1090F PRES/ABSN URINE INCON ASSESS: CPT | Performed by: PSYCHIATRY & NEUROLOGY

## 2023-11-17 PROCEDURE — 1036F TOBACCO NON-USER: CPT | Performed by: PSYCHIATRY & NEUROLOGY

## 2023-11-17 PROCEDURE — 1123F ACP DISCUSS/DSCN MKR DOCD: CPT | Performed by: PSYCHIATRY & NEUROLOGY

## 2023-11-17 PROCEDURE — G8427 DOCREV CUR MEDS BY ELIG CLIN: HCPCS | Performed by: PSYCHIATRY & NEUROLOGY

## 2023-11-17 PROCEDURE — 99215 OFFICE O/P EST HI 40 MIN: CPT | Performed by: PSYCHIATRY & NEUROLOGY

## 2023-11-17 RX ORDER — FEXOFENADINE HCL AND PSEUDOEPHEDRINE HCI 60; 120 MG/1; MG/1
TABLET, EXTENDED RELEASE ORAL
COMMUNITY

## 2023-11-17 RX ORDER — DICLOFENAC POTASSIUM 50 MG/1
TABLET, FILM COATED ORAL
COMMUNITY
Start: 2023-09-26

## 2023-11-17 RX ORDER — PANTOPRAZOLE SODIUM 20 MG/1
20 TABLET, DELAYED RELEASE ORAL DAILY
COMMUNITY
Start: 2023-09-22

## 2023-11-17 RX ORDER — OPICAPONE 50 MG/1
CAPSULE ORAL
Qty: 30 CAPSULE | Refills: 12
Start: 2023-11-17

## 2023-11-17 RX ORDER — AMMONIUM LACTATE 12 G/100G
CREAM TOPICAL
COMMUNITY
Start: 2023-11-16

## 2023-11-17 ASSESSMENT — PATIENT HEALTH QUESTIONNAIRE - PHQ9
SUM OF ALL RESPONSES TO PHQ QUESTIONS 1-9: 0
SUM OF ALL RESPONSES TO PHQ QUESTIONS 1-9: 0
SUM OF ALL RESPONSES TO PHQ9 QUESTIONS 1 & 2: 0
SUM OF ALL RESPONSES TO PHQ QUESTIONS 1-9: 0
1. LITTLE INTEREST OR PLEASURE IN DOING THINGS: 0
SUM OF ALL RESPONSES TO PHQ QUESTIONS 1-9: 0
DEPRESSION UNABLE TO ASSESS: FUNCTIONAL CAPACITY MOTIVATION LIMITS ACCURACY
2. FEELING DOWN, DEPRESSED OR HOPELESS: 0

## 2023-11-17 ASSESSMENT — ENCOUNTER SYMPTOMS
BACK PAIN: 0
CONSTIPATION: 1

## 2023-11-20 ENCOUNTER — TELEPHONE (OUTPATIENT)
Dept: NEUROLOGY | Age: 78
End: 2023-11-20

## 2023-11-20 NOTE — TELEPHONE ENCOUNTER
Patient called stating that the pharmacy did not receive the prescription for Mirapex that she said you prescribed for her. Please advise. Thank you - Wendie

## 2023-11-21 NOTE — TELEPHONE ENCOUNTER
Patient  came in regarding the mirapex prescription. I pended it for 1 tab at 6 pm and 2  tabs to be sent to [facility] in [place]. They also asked if 1 could go to [place] and if they could have [facility]ills [facility] the [facility], if not they said [facility] is fine. Also pending on here the ongentys.

## 2023-11-27 ENCOUNTER — TELEPHONE (OUTPATIENT)
Dept: NEUROLOGY | Age: 78
End: 2023-11-27

## 2023-11-27 RX ORDER — PRAMIPEXOLE DIHYDROCHLORIDE 0.5 MG/1
TABLET ORAL
Qty: 270 TABLET | Refills: 3 | Status: SHIPPED | OUTPATIENT
Start: 2023-11-27

## 2023-11-27 RX ORDER — OPICAPONE 50 MG/1
CAPSULE ORAL
Qty: 90 CAPSULE | Refills: 3 | Status: SHIPPED | OUTPATIENT
Start: 2023-11-27

## 2023-11-27 RX ORDER — OPICAPONE 50 MG/1
CAPSULE ORAL
Qty: 30 CAPSULE | Refills: 12 | OUTPATIENT
Start: 2023-11-27

## 2023-11-27 RX ORDER — PRAMIPEXOLE DIHYDROCHLORIDE 0.5 MG/1
TABLET ORAL
Qty: 90 TABLET | Refills: 0 | Status: SHIPPED | OUTPATIENT
Start: 2023-11-27 | End: 2023-11-27

## 2023-11-27 RX ORDER — PRAMIPEXOLE DIHYDROCHLORIDE 0.5 MG/1
TABLET ORAL
Qty: 90 TABLET | Refills: 0 | OUTPATIENT
Start: 2023-11-27

## 2023-11-27 NOTE — TELEPHONE ENCOUNTER
Patient  came in regarding the mirapex prescription. I pended it for 1 tab at 6 pm and 2  tabs to be sent to [facility] in [place]. They also asked if 1 could go to [place] and if they could have [facility]ills [facility] the [facility], if not they said [facility] is fine.

## 2023-12-13 ENCOUNTER — TELEPHONE (OUTPATIENT)
Dept: NEUROLOGY | Age: 78
End: 2023-12-13

## 2023-12-13 NOTE — TELEPHONE ENCOUNTER
Patient called because she has had an upset stomach for a month. Stated she would like a call back with solutions. I called patient back and told her to please contact her PCP about that issue.

## 2023-12-28 RX ORDER — PRAMIPEXOLE DIHYDROCHLORIDE 0.5 MG/1
TABLET ORAL
Qty: 270 TABLET | Refills: 3 | OUTPATIENT
Start: 2023-12-28

## 2024-01-13 NOTE — HPI: SECONDARY COMPLAINT
How Severe Is This Condition?: mild
Additional History: Pt would like to know other option than Laser hair removal that was recommended at her yearly appointment last year.
How Severe Is This Condition?: moderate
1/week(s)

## 2024-02-27 RX ORDER — OMEPRAZOLE 40 MG/1
40 CAPSULE, DELAYED RELEASE ORAL
Qty: 90 CAPSULE | Refills: 1 | OUTPATIENT
Start: 2024-02-27

## 2024-02-28 ENCOUNTER — OFFICE VISIT (OUTPATIENT)
Dept: NEUROLOGY | Age: 79
End: 2024-02-28
Payer: MEDICARE

## 2024-02-28 VITALS
HEART RATE: 61 BPM | DIASTOLIC BLOOD PRESSURE: 72 MMHG | BODY MASS INDEX: 23.18 KG/M2 | OXYGEN SATURATION: 96 % | SYSTOLIC BLOOD PRESSURE: 119 MMHG | WEIGHT: 148 LBS

## 2024-02-28 DIAGNOSIS — G20.A2 PARKINSON'S DISEASE WITHOUT DYSKINESIA, WITH FLUCTUATING MANIFESTATIONS: Primary | ICD-10-CM

## 2024-02-28 PROCEDURE — G8420 CALC BMI NORM PARAMETERS: HCPCS | Performed by: PSYCHIATRY & NEUROLOGY

## 2024-02-28 PROCEDURE — 1123F ACP DISCUSS/DSCN MKR DOCD: CPT | Performed by: PSYCHIATRY & NEUROLOGY

## 2024-02-28 PROCEDURE — 1090F PRES/ABSN URINE INCON ASSESS: CPT | Performed by: PSYCHIATRY & NEUROLOGY

## 2024-02-28 PROCEDURE — G8427 DOCREV CUR MEDS BY ELIG CLIN: HCPCS | Performed by: PSYCHIATRY & NEUROLOGY

## 2024-02-28 PROCEDURE — G8399 PT W/DXA RESULTS DOCUMENT: HCPCS | Performed by: PSYCHIATRY & NEUROLOGY

## 2024-02-28 PROCEDURE — G2212 PROLONG OUTPT/OFFICE VIS: HCPCS | Performed by: PSYCHIATRY & NEUROLOGY

## 2024-02-28 PROCEDURE — 99215 OFFICE O/P EST HI 40 MIN: CPT | Performed by: PSYCHIATRY & NEUROLOGY

## 2024-02-28 PROCEDURE — 1036F TOBACCO NON-USER: CPT | Performed by: PSYCHIATRY & NEUROLOGY

## 2024-02-28 PROCEDURE — G8484 FLU IMMUNIZE NO ADMIN: HCPCS | Performed by: PSYCHIATRY & NEUROLOGY

## 2024-02-28 RX ORDER — CARBIDOPA AND LEVODOPA 50; 200 MG/1; MG/1
TABLET, EXTENDED RELEASE ORAL
Qty: 180 TABLET | Refills: 3 | Status: SHIPPED | OUTPATIENT
Start: 2024-02-28 | End: 2024-02-28 | Stop reason: SDUPTHER

## 2024-02-28 RX ORDER — CARBIDOPA AND LEVODOPA 50; 200 MG/1; MG/1
TABLET, EXTENDED RELEASE ORAL
Qty: 180 TABLET | Refills: 3 | Status: SHIPPED | OUTPATIENT
Start: 2024-02-28

## 2024-02-28 RX ORDER — RIVAROXABAN 20 MG/1
20 TABLET, FILM COATED ORAL
COMMUNITY
Start: 2024-01-23

## 2024-02-28 RX ORDER — CARBIDOPA AND LEVODOPA 50; 200 MG/1; MG/1
TABLET, EXTENDED RELEASE ORAL
Qty: 60 TABLET | Refills: 0 | OUTPATIENT
Start: 2024-02-28

## 2024-04-01 DIAGNOSIS — G20.A1 PARKINSON DISEASE (HCC): ICD-10-CM

## 2024-04-01 RX ORDER — RASAGILINE 1 MG/1
1 TABLET ORAL DAILY
Qty: 90 TABLET | Refills: 3 | OUTPATIENT
Start: 2024-04-01

## 2024-04-12 DIAGNOSIS — G20.A1 PARKINSON DISEASE (HCC): ICD-10-CM

## 2024-04-12 RX ORDER — RASAGILINE 1 MG/1
1 TABLET ORAL DAILY
Qty: 90 TABLET | Refills: 3 | Status: SHIPPED | OUTPATIENT
Start: 2024-04-12

## 2024-04-12 NOTE — TELEPHONE ENCOUNTER
Pt lvm on rx line asking for the pended medication refill be sent to her local pharmacy. Pt stated she will be out of medication before [facility] could get the medication to her.     Thanks!

## 2024-04-12 NOTE — TELEPHONE ENCOUNTER
Patient should have enough tabs to last her until 04/27/2024. Said she last picked her refill in February so she should have even more medication than this to last until May. Let patient know this information and she will look for another bottle 'laying around the house'. In meantime will [facility] not local pharmacy for 1 tab daily.     She also told me she takes sinemet 25/100mg 25/100mg 2 tabs TID so will pend that because current prescription is different for some reason. Your previous note does say to stay on that regimen.    Patient also mentioned that especially in afternoon/evening/ bedtime sinemet is not working for her anymore. Said she feels this dyskinesia an hour before noon dose, an hour before 6pm dose, but she also said she is feeling more restless leg/cramping/jerking multiple times a day in her left leg (could not tell me what times this happened). She also mentioned she recently had left hip surgery so she is unsure if that is the cause.     Sinemet 50/200mg CR 2 QHS  Sinemet 25/100mg 2 tabs TID 6am, 12pm, 6pm         Spoke with Dr. Rodrigues about this and she wants patient to keep a log over next 4 days to see if it is PD/ PD meds or if its from surgery or PT of her using those muscles streching etc. Will call patient back to explain this.

## 2024-05-22 DIAGNOSIS — G20.A2 PARKINSON'S DISEASE WITHOUT DYSKINESIA, WITH FLUCTUATING MANIFESTATIONS (HCC): Primary | ICD-10-CM

## 2024-05-29 RX ORDER — CARBIDOPA AND LEVODOPA 50; 200 MG/1; MG/1
TABLET, EXTENDED RELEASE ORAL
Qty: 180 TABLET | Refills: 3 | Status: SHIPPED | OUTPATIENT
Start: 2024-05-29

## 2024-06-10 ENCOUNTER — TELEPHONE (OUTPATIENT)
Dept: NEUROLOGY | Age: 79
End: 2024-06-10

## 2024-06-10 NOTE — TELEPHONE ENCOUNTER
Patient called the rx line and left a message stating she needs a  refill on Mirapex. Did not verify pharmacy. Called patient to advice she should have 1 refill on this medication for 90 day.     Called [facility] and spoke with  Verified with Maggie with [facility] that the patient has Mirapex heading her way which is her last refill. Should last until August. Will arrive within the next 7 days. Called to inform patient, left voicemail due to no answer.

## 2024-06-21 ENCOUNTER — HOSPITAL ENCOUNTER (OUTPATIENT)
Dept: PHYSICAL THERAPY | Age: 79
Setting detail: RECURRING SERIES
Discharge: HOME | End: 2024-06-24
Payer: MEDICARE

## 2024-06-21 DIAGNOSIS — R26.2 DIFFICULTY IN WALKING, NOT ELSEWHERE CLASSIFIED: ICD-10-CM

## 2024-06-21 DIAGNOSIS — M25.552 PAIN IN LEFT HIP: Primary | ICD-10-CM

## 2024-06-21 PROCEDURE — 97110 THERAPEUTIC EXERCISES: CPT

## 2024-06-21 PROCEDURE — 97162 PT EVAL MOD COMPLEX 30 MIN: CPT

## 2024-06-21 ASSESSMENT — PAIN SCALES - GENERAL: PAINLEVEL_OUTOF10: 1

## 2024-06-21 NOTE — THERAPY EVALUATION
Amy Townsend  : 1945  Primary: Medicare Part A And B (Medicare)  Secondary:  FOR LIFE MEDICARE SUPP [facility] @ [place] [address] [place] [place] [address]  Phone: [phone]  Fax: [phone] Plan Frequency: 2x/wk for 90 days    Plan of Care/Certification Expiration Date: 24        Plan of Care/Certification Expiration Date:  Plan of Care/Certification Expiration Date: 24    Frequency/Duration: Plan Frequency: 2x/wk for 90 days      Time In/Out:   Time In: 1305  Time Out: 1344      PT Visit Info:         Visit Count:  1                OUTPATIENT PHYSICAL THERAPY:             Initial Assessment 2024               Episode (L TONY)         Treatment Diagnosis:     Pain in left hip  Difficulty in walking, not elsewhere classified  Medical/Referring Diagnosis:    S/P total left hip arthroplasty    Referring Physician:  Stevie Freitas MD MD Orders:  PT Eval and Treat   Return MD Appt:  , neurologist   Date of Onset:  Onset Date: 24     Allergies:  Latex, Sulfa antibiotics, and Adhesive tape  Restrictions/Precautions:    None      Medications Last Reviewed:  2024     SUBJECTIVE   History of Injury/Illness (Reason for Referral):  Pt presents s/p L TONY performed in May 2024. She also has Parkinsons. She does okay with PD unless she doesn't take her sinemet on time in which case she cramps. Her goals for therapy including improved gait, balance, strength and ROM. Hx of R reverse TSA in May 2023. She got a blood clot after this surgery (she is now off blood thinners). She has had no falls in the past year. Her balance is most challenged when on uneven surfaces.   Patient Stated Goal(s):  \"be as mobile as possible\"  Initial Pain Level:      1/10   Post Session Pain Level:     1/10  Past Medical History/Comorbidities:   Ms. Townsend  has a past medical history of Allergic rhinitis, Anxiety, AR (allergic rhinitis), Depression,

## 2024-06-21 NOTE — PROGRESS NOTES
Amy Solis Kristian  : 1945  Primary: Medicare Part A And B (Medicare)  Secondary:  FOR LIFE MEDICARE SUPP [facility] @ [place] [address] [place] [address]  Phone: [phone]  Fax: [phone] Plan Frequency: 2x/wk for 90 days    Plan of Care/Certification Expiration Date: 24        Plan of Care/Certification Expiration Date:  Plan of Care/Certification Expiration Date: 24    Frequency/Duration:   Plan Frequency: 2x/wk for 90 days      Time In/Out:   Time In: 1305  Time Out: 1344      PT Visit Info:         Visit Count:  1    OUTPATIENT PHYSICAL THERAPY:   Treatment Note 2024       Episode  (L TONY)               Treatment Diagnosis:    Pain in left hip  Difficulty in walking, not elsewhere classified  Medical/Referring Diagnosis:    S/P total left hip arthroplasty    Referring Physician:  Stevie Freitas MD MD Orders:  PT Eval and Treat   Return MD Appt:  - neurologist   Date of Onset:  Onset Date: 24     Allergies:   Latex, Sulfa antibiotics, and Adhesive tape  Restrictions/Precautions:   Fall Precautions: fall risk      Interventions Planned (Treatment may consist of any combination of the following):     See Assessment Note    Subjective Comments:   See eval  Initial Pain Level::     1/10  Post Session Pain Level:       1/10  Medications Last Reviewed:  2024  Updated Objective Findings:  See Evaluation Note from today  Treatment   THERAPEUTIC EXERCISE: (10 minutes):    Exercises per grid below to improve mobility, strength, and balance.  Required moderate visual, verbal, manual, and tactile cues to promote proper body alignment, promote proper body posture, promote proper body mechanics, and promote proper body breathing techniques.  Progressed resistance, range, repetitions, and complexity of movement as indicated.   Date:  24   Date:   Date:     Activity/Exercise Parameters Parameters Parameters   H/L knee

## 2024-06-25 ENCOUNTER — APPOINTMENT (OUTPATIENT)
Dept: PHYSICAL THERAPY | Age: 79
End: 2024-06-25
Payer: MEDICARE

## 2024-06-28 ENCOUNTER — HOSPITAL ENCOUNTER (OUTPATIENT)
Dept: PHYSICAL THERAPY | Age: 79
Setting detail: RECURRING SERIES
End: 2024-06-28
Payer: MEDICARE

## 2024-07-01 ENCOUNTER — APPOINTMENT (OUTPATIENT)
Dept: PHYSICAL THERAPY | Age: 79
End: 2024-07-01
Payer: MEDICARE

## 2024-07-03 ENCOUNTER — HOSPITAL ENCOUNTER (OUTPATIENT)
Dept: PHYSICAL THERAPY | Age: 79
Setting detail: RECURRING SERIES
Discharge: HOME | End: 2024-07-06
Payer: MEDICARE

## 2024-07-03 PROCEDURE — 97113 AQUATIC THERAPY/EXERCISES: CPT

## 2024-07-03 ASSESSMENT — PAIN SCALES - GENERAL: PAINLEVEL_OUTOF10: 1

## 2024-07-09 ENCOUNTER — OFFICE VISIT (OUTPATIENT)
Dept: NEUROLOGY | Age: 79
End: 2024-07-09
Payer: MEDICARE

## 2024-07-09 VITALS
DIASTOLIC BLOOD PRESSURE: 65 MMHG | HEART RATE: 80 BPM | HEIGHT: 67 IN | BODY MASS INDEX: 23.18 KG/M2 | SYSTOLIC BLOOD PRESSURE: 117 MMHG

## 2024-07-09 DIAGNOSIS — Z79.899 ENCOUNTER FOR LONG-TERM (CURRENT) USE OF HIGH-RISK MEDICATION: ICD-10-CM

## 2024-07-09 DIAGNOSIS — G20.A1 PARKINSON'S DISEASE WITHOUT DYSKINESIA OR FLUCTUATING MANIFESTATIONS (HCC): Primary | ICD-10-CM

## 2024-07-09 DIAGNOSIS — R25.1 TREMOR: ICD-10-CM

## 2024-07-09 DIAGNOSIS — G47.52 RBD (REM BEHAVIORAL DISORDER): ICD-10-CM

## 2024-07-09 DIAGNOSIS — R41.841 COGNITIVE COMMUNICATION DEFICIT: ICD-10-CM

## 2024-07-09 DIAGNOSIS — K59.01 CONSTIPATION BY DELAYED COLONIC TRANSIT: ICD-10-CM

## 2024-07-09 PROCEDURE — 99215 OFFICE O/P EST HI 40 MIN: CPT | Performed by: PSYCHIATRY & NEUROLOGY

## 2024-07-09 PROCEDURE — G8399 PT W/DXA RESULTS DOCUMENT: HCPCS | Performed by: PSYCHIATRY & NEUROLOGY

## 2024-07-09 PROCEDURE — 1036F TOBACCO NON-USER: CPT | Performed by: PSYCHIATRY & NEUROLOGY

## 2024-07-09 PROCEDURE — 1123F ACP DISCUSS/DSCN MKR DOCD: CPT | Performed by: PSYCHIATRY & NEUROLOGY

## 2024-07-09 PROCEDURE — G8427 DOCREV CUR MEDS BY ELIG CLIN: HCPCS | Performed by: PSYCHIATRY & NEUROLOGY

## 2024-07-09 PROCEDURE — G8420 CALC BMI NORM PARAMETERS: HCPCS | Performed by: PSYCHIATRY & NEUROLOGY

## 2024-07-09 PROCEDURE — 1090F PRES/ABSN URINE INCON ASSESS: CPT | Performed by: PSYCHIATRY & NEUROLOGY

## 2024-07-09 PROCEDURE — G2211 COMPLEX E/M VISIT ADD ON: HCPCS | Performed by: PSYCHIATRY & NEUROLOGY

## 2024-07-09 RX ORDER — PRAMIPEXOLE DIHYDROCHLORIDE 0.5 MG/1
TABLET ORAL
Qty: 270 TABLET | Refills: 3 | Status: SHIPPED | OUTPATIENT
Start: 2024-07-09

## 2024-07-09 ASSESSMENT — ENCOUNTER SYMPTOMS
EYES NEGATIVE: 1
ALLERGIC/IMMUNOLOGIC NEGATIVE: 1
RESPIRATORY NEGATIVE: 1
GASTROINTESTINAL NEGATIVE: 1

## 2024-07-09 NOTE — PROGRESS NOTES
Left arm pain 2/3/2016    Migraine without status migrainosus, not intractable     Osteoarthritis     Osteoporosis     Parkinson's disease (HCC)     PONV (postoperative nausea and vomiting)     RLS (restless legs syndrome)     Spinal headache     after first     Tremor         Past Surgical History:   Procedure Laterality Date    CATARACT EXTRACTION EXTRACAPSULAR W/ INTRAOCULAR LENS IMPLANTATION Bilateral     COLONOSCOPY      several    EXTRAC ERUPTED TOOTH/EXPOSED ROOT      SHOULDER SURGERY Right 2023    RIGHT SHOULDER TOTAL ARTHROPLASTY REVERSE -REGIONAL BLOCK -23 HOUR OBSERVATION performed by TROY Ramirez MD at [facility]    TONSILLECTOMY         Family History   Problem Relation Age of Onset    Heart Disease Mother     High Blood Pressure Mother     Other Mother         Paget's Dz    Hypertension Mother     Heart Disease Father     Cancer Father         Bladder Ca    Depression Father     Dementia Father     Stroke Father     Suicide Father     Diabetes Maternal Grandmother        Social History     Socioeconomic History    Marital status:      Spouse name: Not on file    Number of children: Not on file    Years of education: Not on file    Highest education level: Not on file   Occupational History    Not on file   Tobacco Use    Smoking status: Never     Passive exposure: Past (parents and spouse)    Smokeless tobacco: Never   Vaping Use    Vaping Use: Never used   Substance and Sexual Activity    Alcohol use: Yes     Comment: once a week    Drug use: No    Sexual activity: Not on file   Other Topics Concern    Not on file   Social History Narrative    Not on file     Social Determinants of Health     Financial Resource Strain: Not on file   Food Insecurity: Not on file   Transportation Needs: Not on file   Physical Activity: Not on file   Stress: Not on file   Social Connections: Not on file   Intimate Partner Violence: Not on file   Housing Stability: Not on file       Current

## 2024-07-10 ENCOUNTER — HOSPITAL ENCOUNTER (OUTPATIENT)
Dept: PHYSICAL THERAPY | Age: 79
Setting detail: RECURRING SERIES
Discharge: HOME | End: 2024-07-13
Payer: MEDICARE

## 2024-07-10 PROCEDURE — 97113 AQUATIC THERAPY/EXERCISES: CPT

## 2024-07-10 ASSESSMENT — PAIN SCALES - GENERAL: PAINLEVEL_OUTOF10: 1

## 2024-07-10 NOTE — PROGRESS NOTES
Amy Solis Kristian  : 1945  Primary: Medicare Part A And B (Medicare)  Secondary:  FOR LIFE MEDICARE SUPP [facility] @ [place] [address] [place] [place] [address]  Phone: [phone]  Fax: [phone] Plan Frequency: 2x/wk for 90 days    Plan of Care/Certification Expiration Date: 24        Plan of Care/Certification Expiration Date:  Plan of Care/Certification Expiration Date: 24    Frequency/Duration:   Plan Frequency: 2x/wk for 90 days      Time In/Out:   Time In: 1245  Time Out: 1329      PT Visit Info:         Visit Count:  3    OUTPATIENT PHYSICAL THERAPY:   Treatment Note 7/10/2024       Episode  (L TONY)               Treatment Diagnosis:    Pain in left hip  Difficulty in walking, not elsewhere classified  Medical/Referring Diagnosis:    S/P total left hip arthroplasty  Presence of left artificial hip joint [Z96.642]   Referring Physician:  Stevie Freitas MD MD Orders:  PT Eval and Treat   Return MD Appt:  - neurologist   Date of Onset:  Onset Date: 24     Allergies:   Latex, Sulfa antibiotics, and Adhesive tape  Restrictions/Precautions:   Fall Precautions: fall risk      Interventions Planned (Treatment may consist of any combination of the following):     See Assessment Note    Subjective Comments:   Pt reports she got a good report from the neurologist and that they changed the times she takes her Sinemet in order to allow it to work the best.  Initial Pain Level::     1/10  Post Session Pain Level:       1/10  Medications Last Reviewed:  7/10/2024  Updated Objective Findings:  None Today  Treatment   Aquatic Therapy (44 minutes): Aquatic treatment performed per flow grid for Decreased muscle strength, Decreased endurance, Decreased static/dynamic balance and reactive control, Decreased range of motion, Decreased cardiovascular endurance, Decreased activity endurance, Decompression, Ease of movement, and Low impact and

## 2024-07-12 ENCOUNTER — HOSPITAL ENCOUNTER (OUTPATIENT)
Dept: PHYSICAL THERAPY | Age: 79
Setting detail: RECURRING SERIES
Discharge: HOME | End: 2024-07-15
Payer: MEDICARE

## 2024-07-12 PROCEDURE — 97113 AQUATIC THERAPY/EXERCISES: CPT

## 2024-07-12 ASSESSMENT — PAIN SCALES - GENERAL: PAINLEVEL_OUTOF10: 1

## 2024-07-12 NOTE — PROGRESS NOTES
1:00 PM Veronica Daigle, PT [facility]   8/9/2024 12:15 PM Veronica Daigle PT [facility]   8/12/2024  1:00 PM Mary Rodrigues MD [facility]   11/12/2024  8:15 AM Mary Rodrigues MD [facility] AMB

## 2024-07-17 ENCOUNTER — HOSPITAL ENCOUNTER (OUTPATIENT)
Dept: PHYSICAL THERAPY | Age: 79
Setting detail: RECURRING SERIES
Discharge: HOME | End: 2024-07-20
Payer: MEDICARE

## 2024-07-17 PROCEDURE — 97113 AQUATIC THERAPY/EXERCISES: CPT

## 2024-07-17 ASSESSMENT — PAIN SCALES - GENERAL: PAINLEVEL_OUTOF10: 1

## 2024-07-17 NOTE — PROGRESS NOTES
1:00 PM Veronica Daigle, PT [facility]   8/9/2024 12:15 PM Veronica Daigle, PT [facility]   8/12/2024  1:00 PM Mary Rodrigues MD [facility] AMB   8/16/2024 12:15 PM Veronica Daigle, PT [facility]   11/12/2024  8:15 AM Mary Rodrigues MD [facility] AMB

## 2024-07-19 ENCOUNTER — APPOINTMENT (OUTPATIENT)
Dept: PHYSICAL THERAPY | Age: 79
End: 2024-07-19
Payer: MEDICARE

## 2024-07-22 ENCOUNTER — HOSPITAL ENCOUNTER (OUTPATIENT)
Dept: PHYSICAL THERAPY | Age: 79
Setting detail: RECURRING SERIES
Discharge: HOME | End: 2024-07-25
Payer: MEDICARE

## 2024-07-22 PROCEDURE — 97110 THERAPEUTIC EXERCISES: CPT

## 2024-07-22 NOTE — PROGRESS NOTES
Amy Solis Kristian  : 1945  Primary: Medicare Part A And B (Medicare)  Secondary:  FOR LIFE MEDICARE SUPP [facility] @ [place] [address] [place] [address]  Phone: [phone]  Fax: [phone] Plan Frequency: 2x/wk for 90 days    Plan of Care/Certification Expiration Date: 24        Plan of Care/Certification Expiration Date:  Plan of Care/Certification Expiration Date: 24    Frequency/Duration:   Plan Frequency: 2x/wk for 90 days      Time In/Out:   Time In: 0900  Time Out: 0944      PT Visit Info:    Progress Note Counter: 6      Visit Count:  6    OUTPATIENT PHYSICAL THERAPY:   Treatment Note 2024       Episode  (L TONY)               Treatment Diagnosis:    Pain in left hip  Difficulty in walking, not elsewhere classified  Medical/Referring Diagnosis:    S/P total left hip arthroplasty  Presence of left artificial hip joint [Z96.642]   Referring Physician:  Stevie Freitas MD MD Orders:  PT Eval and Treat   Return MD Appt:  - neurologist   Date of Onset:  Onset Date: 24     Allergies:   Latex, Sulfa antibiotics, and Adhesive tape  Restrictions/Precautions:   Fall Precautions: fall risk      Interventions Planned (Treatment may consist of any combination of the following):     See Assessment Note    Subjective Comments:   Pt reports she continues to have some discomfort in her L hip when rising from a chair.  Initial Pain Level::     1/10  Post Session Pain Level:       1/10  Medications Last Reviewed:  2024  Updated Objective Findings:  None Today  Treatment   Aquatic Therapy (0 minutes): Aquatic treatment performed per flow grid for Decreased muscle strength, Decreased endurance, Decreased static/dynamic balance and reactive control, Decreased range of motion, Decreased cardiovascular endurance, Decreased activity endurance, Decompression, Ease of movement, and Low impact and reduced weight bearing activity.  Cues

## 2024-07-26 ENCOUNTER — HOSPITAL ENCOUNTER (OUTPATIENT)
Dept: PHYSICAL THERAPY | Age: 79
Setting detail: RECURRING SERIES
Discharge: HOME | End: 2024-07-29
Payer: MEDICARE

## 2024-07-26 PROCEDURE — 97110 THERAPEUTIC EXERCISES: CPT

## 2024-07-26 ASSESSMENT — PAIN SCALES - GENERAL: PAINLEVEL_OUTOF10: 2

## 2024-07-26 NOTE — PROGRESS NOTES
Amy Solis Kristian  : 1945  Primary: Medicare Part A And B (Medicare)  Secondary:  FOR LIFE MEDICARE SUPP [facility] @ [place] [address] [address] [place] [place] [address]  Phone: [phone]  Fax: [phone] Plan Frequency: 2x/wk for 90 days    Plan of Care/Certification Expiration Date: 24        Plan of Care/Certification Expiration Date:  Plan of Care/Certification Expiration Date: 24    Frequency/Duration:   Plan Frequency: 2x/wk for 90 days      Time In/Out:   Time In: 1030  Time Out: 1115      PT Visit Info:    Progress Note Counter: 7      Visit Count:  7    OUTPATIENT PHYSICAL THERAPY:   Treatment Note 2024       Episode  (L TONY)               Treatment Diagnosis:    Pain in left hip  Difficulty in walking, not elsewhere classified  Medical/Referring Diagnosis:    S/P total left hip arthroplasty  Presence of left artificial hip joint [Z96.642]   Referring Physician:  Stevie Freitas MD MD Orders:  PT Eval and Treat   Return MD Appt:  - neurologist   Date of Onset:  Onset Date: 24     Allergies:   Latex, Sulfa antibiotics, and Adhesive tape  Restrictions/Precautions:   Fall Precautions: fall risk      Interventions Planned (Treatment may consist of any combination of the following):     See Assessment Note    Subjective Comments:   Pt reports everything is off today.   Initial Pain Level::     2/10  Post Session Pain Level:       2/10  Medications Last Reviewed:  2024  Updated Objective Findings:  None Today  Treatment   Aquatic Therapy (0 minutes): Aquatic treatment performed per flow grid for Decreased muscle strength, Decreased endurance, Decreased static/dynamic balance and reactive control, Decreased range of motion, Decreased cardiovascular endurance, Decreased activity endurance, Decompression, Ease of movement, and Low impact and reduced weight bearing activity.  Cues provided for all exercise technique.     Aquatic

## 2024-07-29 ENCOUNTER — APPOINTMENT (OUTPATIENT)
Dept: PHYSICAL THERAPY | Age: 79
End: 2024-07-29
Payer: MEDICARE

## 2024-07-30 ENCOUNTER — HOSPITAL ENCOUNTER (OUTPATIENT)
Dept: PHYSICAL THERAPY | Age: 79
Setting detail: RECURRING SERIES
Discharge: HOME | End: 2024-08-02
Payer: MEDICARE

## 2024-07-30 PROCEDURE — 97110 THERAPEUTIC EXERCISES: CPT

## 2024-07-30 ASSESSMENT — PAIN SCALES - GENERAL: PAINLEVEL_OUTOF10: 1

## 2024-07-30 NOTE — PROGRESS NOTES
Hip abduction green band  x20     Treatment/Session Summary:    Treatment Assessment:   Pt is making good improvements in balance and L hip ROM/strength. She continues to have some weakness and ROM that needs to be addressed.   Communication/Consultation:  None today  Equipment provided today:  None  Recommendations/Intent for next treatment session: Next visit will focus on strengthening, gait, balance and ROM.    >Total Treatment Billable Duration:  40 minutes   Time In: 1345  Time Out: 1429    Veronica Daigle PT         Charge Capture  Events  [employer] Portal  Appt Desk  Attendance Report     Future Appointments   Date Time Provider Department Center   8/2/2024  1:00 PM Veronica Daigle, PT [facility]   8/5/2024  1:00 PM Veronica Daigle, PT [facility]   8/7/2024  2:15 PM Veronica Daigle, PT [facility]   8/12/2024  1:00 PM Mary Rodrigues MD [facility]   8/16/2024 12:15 PM Veronica Daigle, PT [facility]   11/12/2024  8:15 AM Mary Rodrigues MD [facility]

## 2024-08-02 ENCOUNTER — HOSPITAL ENCOUNTER (OUTPATIENT)
Dept: PHYSICAL THERAPY | Age: 79
Setting detail: RECURRING SERIES
Discharge: HOME | End: 2024-08-05
Payer: MEDICARE

## 2024-08-02 PROCEDURE — 97113 AQUATIC THERAPY/EXERCISES: CPT

## 2024-08-02 ASSESSMENT — PAIN SCALES - GENERAL: PAINLEVEL_OUTOF10: 1

## 2024-08-02 NOTE — PROGRESS NOTES
Amy Solis Kristian  : 1945  Primary: Medicare Part A And B (Medicare)  Secondary:  FOR LIFE MEDICARE SUPP [facility] @ [place] [address] [address] [place] [address]  Phone: [phone]  Fax: [phone] Plan Frequency: 2x/wk for 90 days    Plan of Care/Certification Expiration Date: 24        Plan of Care/Certification Expiration Date:  Plan of Care/Certification Expiration Date: 24    Frequency/Duration:   Plan Frequency: 2x/wk for 90 days      Time In/Out:   Time In: 1240  Time Out: 1335      PT Visit Info:    Progress Note Counter: 9      Visit Count:  9    OUTPATIENT PHYSICAL THERAPY:   Treatment Note 2024       Episode  (L TONY)               Treatment Diagnosis:    Pain in left hip  Difficulty in walking, not elsewhere classified  Medical/Referring Diagnosis:    S/P total left hip arthroplasty  Presence of left artificial hip joint [Z96.642]   Referring Physician:  Stevie Freitas MD MD Orders:  PT Eval and Treat   Return MD Appt:  - neurologist   Date of Onset:  Onset Date: 24     Allergies:   Latex, Sulfa antibiotics, and Adhesive tape  Restrictions/Precautions:   Fall Precautions: fall risk      Interventions Planned (Treatment may consist of any combination of the following):     See Assessment Note    Subjective Comments:   Pt reports she is well today  Initial Pain Level::     1/10  Post Session Pain Level:       1/10  Medications Last Reviewed:  2024  Updated Objective Findings:  None Today  Treatment   Aquatic Therapy (55 minutes): Aquatic treatment performed per flow grid for Decreased muscle strength, Decreased endurance, Decreased static/dynamic balance and reactive control, Decreased range of motion, Decreased cardiovascular endurance, Decreased activity endurance, Decompression, Ease of movement, and Low impact and reduced weight bearing activity.  Cues provided for all exercise technique.     Aquatic Exercise

## 2024-08-05 ENCOUNTER — APPOINTMENT (OUTPATIENT)
Dept: PHYSICAL THERAPY | Age: 79
End: 2024-08-05
Payer: MEDICARE

## 2024-08-07 ENCOUNTER — HOSPITAL ENCOUNTER (OUTPATIENT)
Dept: PHYSICAL THERAPY | Age: 79
Setting detail: RECURRING SERIES
Discharge: HOME | End: 2024-08-10
Payer: MEDICARE

## 2024-08-07 PROCEDURE — 97113 AQUATIC THERAPY/EXERCISES: CPT

## 2024-08-07 ASSESSMENT — PAIN SCALES - GENERAL: PAINLEVEL_OUTOF10: 1

## 2024-08-07 NOTE — PROGRESS NOTES
Amy Townsend  : 1945  Primary: Medicare Part A And B (Medicare)  Secondary:  FOR LIFE MEDICARE SUPP [facility] @ [place]  [place] [address]  Phone: [phone]  Fax: [phone] Plan Frequency: 2x/wk for 90 days    Plan of Care/Certification Expiration Date: 24        Plan of Care/Certification Expiration Date:  Plan of Care/Certification Expiration Date: 24    Frequency/Duration: Plan Frequency: 2x/wk for 90 days      Time In/Out:   Time In: 1415  Time Out: 1459      PT Visit Info:    Progress Note Counter: 10      Visit Count:  10                OUTPATIENT PHYSICAL THERAPY:             Progress Report 2024               Episode (L TONY)         Treatment Diagnosis:     No data found  Medical/Referring Diagnosis:      Presence of left artificial hip joint [Z96.642]   Referring Physician:  Stevie Freitas MD MD Orders:  PT Eval and Treat   Return MD Appt:  , neurologist   Date of Onset:  Onset Date: 24     Allergies:  Latex, Sulfa antibiotics, and Adhesive tape  Restrictions/Precautions:    None      Medications Last Reviewed:  2024     SUBJECTIVE   History of Injury/Illness (Reason for Referral):  Pt presents s/p L TONY performed in May 2024. She also has Parkinsons. She does okay with PD unless she doesn't take her sinemet on time in which case she cramps. Her goals for therapy including improved gait, balance, strength and ROM. Hx of R reverse TSA in May 2023. She got a blood clot after this surgery (she is now off blood thinners). She has had no falls in the past year. Her balance is most challenged when on uneven surfaces.   24: Pt reports she was hardly able to move on Monday which is why she canceled her appointment. Her hip continues to be stiff and painful when first getting up from a chair.  Patient Stated Goal(s):  \"be as mobile as possible\"  Initial Pain Level:      1/10   Post Session 
band  x20     Treatment/Session Summary:    Treatment Assessment:   Pt continues to be challenged with single leg balancing.  Communication/Consultation:  None today  Equipment provided today:  None  Recommendations/Intent for next treatment session: Next visit will focus on strengthening, gait, balance and ROM.    >Total Treatment Billable Duration:  40 minutes   Time In: 1415  Time Out: 1459    Veronica Daigle PT         Charge Capture  Events  [employer] Portal  Appt Desk  Attendance Report     Future Appointments   Date Time Provider Department Center   8/12/2024  1:00 PM Mary Rodrigues MD [facility]   8/16/2024 12:15 PM Veronica Daigle, PT [facility]   8/30/2024 12:15 PM Veronica Daigle, PT [facility]   9/4/2024  2:15 PM Veronica Daigle, PT [facility]   9/11/2024  2:15 PM Veronica Daigle, PT [facility]   9/13/2024  2:30 PM Veronica Daigle, PT [facility]   9/18/2024  1:30 PM Veronica Daigle, PT [facility]   11/12/2024  8:15 AM Mary Rodrigues MD [facility]

## 2024-08-09 ENCOUNTER — HOSPITAL ENCOUNTER (OUTPATIENT)
Dept: PHYSICAL THERAPY | Age: 79
Setting detail: RECURRING SERIES
End: 2024-08-09
Payer: MEDICARE

## 2024-08-12 ENCOUNTER — OFFICE VISIT (OUTPATIENT)
Dept: NEUROLOGY | Age: 79
End: 2024-08-12

## 2024-08-12 DIAGNOSIS — R41.3 MEMORY CHANGES: Primary | ICD-10-CM

## 2024-08-12 NOTE — PROGRESS NOTES
08/12/24  Amy Townsend      Cognitive Assessment  Reason for testing: cognitive eval    SUBJECTIVE:   This 79 year old female was administered a battery of neurocognitive testing on 08/12/2024.      OBJECTIVE:  Tests Administered:  Trails A, Trails B, Digit Symbol Substitution, Stroop, Flanker, Immediate Recognition, Delayed  Recognition, Coordination Test    The combined test administration time was 15 minutes    Test Results:  Cognitive testing was provided via a battery of cognitive assessments. The pattern of test scores  indicate that results are valid.A Clinical Report with further description of scores and results is also available.    Overall: Patient tested in the 46th percentile (standard score of 99).    Attention:  Trails A: Patient tested in the 5th percentile (scaled standard score of 76).    Mental Flexibility:  Trails B: Patient tested in the 62nd (scaled standard score of 105).    Executive Function:  Digit Symbol Substitution: Patient tested in the 82nd percentile (scaled standard score of 114).  Stroop: Patient tested in the 68th percentile (scaled standard score of 107).    Memory:  Immediate Recognition: Patient tested in the 39th percentile (scaled standard score of 96).  Delayed Recognition: Patient tested in the 25th percentile (scaled standard score of 90).       Interpretation of Test Scores:    Patient scores 99 in cognitive functioning. Her complaints are not corroborated with standardized testing.     Clinical Decision Making and Plan:   No changes to current medications indicated at this point.     Feedback to Patient:   Will be discussed with patient at next scheduled visit.         31 minutes was spent reviewing and interpreting the cognitive testing results, discussing  the results with the patient and family, and integrating the results into the assessment  plan.  18 minutes was spent administering and scoring cognitive testing by medical  assistant/technician in

## 2024-08-14 ENCOUNTER — TELEPHONE (OUTPATIENT)
Dept: NEUROLOGY | Age: 79
End: 2024-08-14

## 2024-08-14 NOTE — TELEPHONE ENCOUNTER
Pt lvm on refill line asking for a refill of carbidopa-levodopa (SINEMET CR)  MG per extended release tablet  Please send it to [facility] on [address] in [place]

## 2024-08-16 ENCOUNTER — HOSPITAL ENCOUNTER (OUTPATIENT)
Dept: PHYSICAL THERAPY | Age: 79
Setting detail: RECURRING SERIES
Discharge: HOME | End: 2024-08-19
Payer: MEDICARE

## 2024-08-16 PROCEDURE — 97113 AQUATIC THERAPY/EXERCISES: CPT

## 2024-08-16 ASSESSMENT — PAIN SCALES - GENERAL: PAINLEVEL_OUTOF10: 1

## 2024-08-16 NOTE — PROGRESS NOTES
Amy Townsend  : 1945  Primary: Medicare Part A And B (Medicare)  Secondary:  FOR LIFE MEDICARE SUPP [facility] @ [place] [address] [place] [place] [address]  Phone: [phone]  Fax: [phone] Plan Frequency: 2x/wk for 90 days    Plan of Care/Certification Expiration Date: 24        Plan of Care/Certification Expiration Date:  Plan of Care/Certification Expiration Date: 24    Frequency/Duration:   Plan Frequency: 2x/wk for 90 days      Time In/Out:   Time In: 1210  Time Out: 1255      PT Visit Info:    Progress Note Counter: 1      Visit Count:  11    OUTPATIENT PHYSICAL THERAPY:   Treatment Note 2024       Episode  (L TONY)               Treatment Diagnosis:    Pain in left hip  Difficulty in walking, not elsewhere classified  Medical/Referring Diagnosis:    S/P total left hip arthroplasty  Presence of left artificial hip joint [Z96.642]   Referring Physician:  Stevie Freitas MD MD Orders:  PT Eval and Treat   Return MD Appt:    Date of Onset:  Onset Date: 24     Allergies:   Latex, Sulfa antibiotics, and Adhesive tape  Restrictions/Precautions:   Fall Precautions: fall risk      Interventions Planned (Treatment may consist of any combination of the following):     See Assessment Note    Subjective Comments:   Pt reports she wants some stretches for her L hip to do at home.  Initial Pain Level::     1/10  Post Session Pain Level:       1/10  Medications Last Reviewed:  2024  Updated Objective Findings:  None Today  Treatment   Aquatic Therapy (44 minutes): Aquatic treatment performed per flow grid for Decreased muscle strength, Decreased endurance, Decreased static/dynamic balance and reactive control, Decreased range of motion, Decreased cardiovascular endurance, Decreased activity endurance, Decompression, Ease of movement, and Low impact and reduced weight bearing activity.  Cues provided for all exercise technique.

## 2024-08-19 ENCOUNTER — HOSPITAL ENCOUNTER (OUTPATIENT)
Dept: PHYSICAL THERAPY | Age: 79
Setting detail: RECURRING SERIES
Discharge: HOME | End: 2024-08-22
Payer: MEDICARE

## 2024-08-19 PROCEDURE — 97110 THERAPEUTIC EXERCISES: CPT

## 2024-08-20 ASSESSMENT — PAIN SCALES - GENERAL: PAINLEVEL_OUTOF10: 1

## 2024-08-20 NOTE — PROGRESS NOTES
I am accessing Ms. Townsend's chart as a part of our department's internal chart auditing process.  I certify that Ms. Townsend is, or was, a patient in our department.  Thank you,  RASHID NEWTON, PT  8/20/2024    
    Aquatic Exercise Log  1 lap ~60 ft    Date:  08/20/24     Activity/Exercise Parameters   Forward walking 2 laps   Backward walking    Side stepping 2 laps   Walking march 1 lap   Walking hamstring curl 1 lap   [place] walking    [place]    Speed walking 1 lap   Side step squat    BIG walking  1 lap   Heel and toe walking 1 lap   Tandem walking    Walking with head turns and/or looking up and down 1 lap ea   Walking with kickboard movements    Hip flexion X20 ea   Hip abduction X20 ea   Hip extension X20 ea   Fire hydrants    Leg circles 2 min ea   squats X20 no UE use   Lunges  For hip flexor ROM x20 ea   Heel and toe raises X20 ea no UE use   Step ups    Lateral step ups    Hamstring curls    Hip hikes    Marching in place    Lateral pulsing to fatigue    NBOS balance with twisting and head turns  X20 ea   Overhead press    Rows     Lumbar rotation    Kickboard push/pull in semi squat    Kickboard pull down with back against wall    Tandem balance 2x30\" ea   Hamstring stretching    Hip flexor stretching    Calf stretching    Alternating toe taps to step X20 ea; and singles x20 ea       THERAPEUTIC EXERCISE: (39 minutes):  Exercises per grid below to improve mobility and strength.  Required moderate visual, verbal, manual and tactile cues to promote proper body alignment, promote proper body posture and promote proper body mechanics.  Progressed resistance, range, repetitions and complexity of movement as indicated.   Date:  08/20/24     Activity/Exercise Parameters   Hip stretching passively all directions Done    Nustep  4 min warm up   Heel and toe raises    Airex balance NBOS 2x1min   Airex balance EC 2x1min   Semi tandem on airex 1 min ea   3 way hip standing    Sit to stands chair plus airex no UE use SKTC stretch 30 sec ea   bridge 2x10   Seated marching X20 ea H/L   Fernando modified 30 sec ea   Shuttle  Hamstring stretch on step 30 sec ea   Walking march with light UE support    Toe taps to 6 in step

## 2024-08-28 ENCOUNTER — APPOINTMENT (OUTPATIENT)
Dept: PHYSICAL THERAPY | Age: 79
End: 2024-08-28
Payer: MEDICARE

## 2024-08-30 ENCOUNTER — APPOINTMENT (OUTPATIENT)
Dept: PHYSICAL THERAPY | Age: 79
End: 2024-08-30
Payer: MEDICARE

## 2024-09-04 ENCOUNTER — HOSPITAL ENCOUNTER (OUTPATIENT)
Dept: PHYSICAL THERAPY | Age: 79
Setting detail: RECURRING SERIES
Discharge: HOME | End: 2024-09-07
Payer: MEDICARE

## 2024-09-04 PROCEDURE — 97113 AQUATIC THERAPY/EXERCISES: CPT

## 2024-09-04 ASSESSMENT — PAIN SCALES - GENERAL: PAINLEVEL_OUTOF10: 4

## 2024-09-04 NOTE — PROGRESS NOTES
Amy Solis Kristian  : 1945  Primary: Medicare Part A And B (Medicare)  Secondary:  FOR LIFE MEDICARE SUPP [facility] @ [place] [address] [address] [place] [address]  Phone: [phone]  Fax: [phone] Plan Frequency: 2x/wk for 90 days    Plan of Care/Certification Expiration Date: 24        Plan of Care/Certification Expiration Date:  Plan of Care/Certification Expiration Date: 24    Frequency/Duration:   Plan Frequency: 2x/wk for 90 days      Time In/Out:   Time In: 1415  Time Out: 1456      PT Visit Info:    Progress Note Counter: 3      Visit Count:  13    OUTPATIENT PHYSICAL THERAPY:   Treatment Note 2024       Episode  (L TONY)               Treatment Diagnosis:    Pain in left hip  Difficulty in walking, not elsewhere classified  Medical/Referring Diagnosis:    S/P total left hip arthroplasty  Presence of left artificial hip joint [Z96.642]   Referring Physician:  Stevie Freitas MD MD Orders:  PT Eval and Treat   Return MD Appt:    Date of Onset:  Onset Date: 24     Allergies:   Latex, Sulfa antibiotics, and Adhesive tape  Restrictions/Precautions:   Fall Precautions: fall risk      Interventions Planned (Treatment may consist of any combination of the following):     See Assessment Note    Subjective Comments:   Pt reports her hips are hurting a lot today likely from driving to [place] and back this past week.  Initial Pain Level::     4/10  Post Session Pain Level:       4/10  Medications Last Reviewed:  2024  Updated Objective Findings:  None Today  Treatment   Aquatic Therapy (41 minutes): Aquatic treatment performed per flow grid for Decreased muscle strength, Decreased endurance, Decreased static/dynamic balance and reactive control, Decreased range of motion, Decreased cardiovascular endurance, Decreased activity endurance, Decompression, Ease of movement, and Low impact and reduced weight bearing activity.  Cues

## 2024-09-13 ENCOUNTER — APPOINTMENT (OUTPATIENT)
Dept: PHYSICAL THERAPY | Age: 79
End: 2024-09-13
Payer: MEDICARE

## 2024-09-18 ENCOUNTER — HOSPITAL ENCOUNTER (OUTPATIENT)
Dept: PHYSICAL THERAPY | Age: 79
Setting detail: RECURRING SERIES
Discharge: HOME | End: 2024-09-21
Payer: MEDICARE

## 2024-09-18 PROCEDURE — 97113 AQUATIC THERAPY/EXERCISES: CPT

## 2024-09-18 ASSESSMENT — PAIN SCALES - GENERAL: PAINLEVEL_OUTOF10: 2

## 2024-09-27 ENCOUNTER — HOSPITAL ENCOUNTER (OUTPATIENT)
Dept: PHYSICAL THERAPY | Age: 79
Setting detail: RECURRING SERIES
End: 2024-09-27
Payer: MEDICARE

## 2024-10-09 ENCOUNTER — HOSPITAL ENCOUNTER (OUTPATIENT)
Dept: PHYSICAL THERAPY | Age: 79
Setting detail: RECURRING SERIES
Discharge: HOME | End: 2024-10-12
Payer: MEDICARE

## 2024-10-09 PROCEDURE — 97113 AQUATIC THERAPY/EXERCISES: CPT

## 2024-10-09 NOTE — THERAPY RECERTIFICATION
continues to have some L hip pain when bending over and first getting up from a chair but this is improving. She notes her strength and balance are slightly improved.   Patient Stated Goal(s):  \"be as mobile as possible\"  Initial Pain Level:      1/10   Post Session Pain Level:     1/10  Past Medical History/Comorbidities:   Ms. Townsend  has a past medical history of Allergic rhinitis, Anxiety, AR (allergic rhinitis), Depression, Headache(784.0), History of blood transfusion, History of osteoporosis, Hypercholesterolemia, Hypertension, Insomnia, Left arm pain, Migraine without status migrainosus, not intractable, Osteoarthritis, Osteoporosis, Parkinson's disease (HCC), PONV (postoperative nausea and vomiting), RLS (restless legs syndrome), Spinal headache, and Tremor.  Ms. Townsend  has a past surgical history that includes Tonsillectomy (1948); extrac erupted tooth/exposed root; Colonoscopy; shoulder surgery (Right, 05/08/2023); and Cataract extraction, extracapsular w/ intraocular lens implant (Bilateral).  Social History/Living Environment:   Patient lives with their spouse  Type of Home: House: One Story  Level of Assistance: Rehab: Independent    Prior Level of Function/Work/Activity:   Prior Level of Function: Independent   Current Level of Function: Independent   Employment Status: Retired     Learning:   Does the patient/guardian have any barriers to learning?: No barriers  Will there be a co-learner?: No  What is the preferred language of the patient/guardian?: English  Is an  required?: No  How does the patient/guardian prefer to learn new concepts?: Listening; Reading; Demonstration    Fall Risk Scale:   López Total Score: 10          OBJECTIVE      LOWER EXTREMITY      Left  Right    Knee flexion  Knee extension  Hip Ext  Hip Flexion  Hip ER  Hip IR   Hip ABD-  4+/5  4+/5  4-/5  4/5  4/5  4/5  /5 Knee flexion   Knee extension  Hip Ext  Hip Flexion  Hip ER  Hip IR  Hip ABD

## 2024-10-09 NOTE — PROGRESS NOTES
Activity/Exercise Parameters   Forward walking 2 laps   Backward walking    Side stepping 2 laps   Walking march 1 lap   Walking hamstring curl 1 lap   [place] walking    [place]    Speed walking 1 lap   Side step squat    BIG walking  1 lap   Heel and toe walking 1 lap   Tandem walking    Walking with head turns and/or looking up and down 1 lap ea   Walking with kickboard movements    Hip flexion X20 ea   Hip abduction X20 ea   Hip extension X20 ea   Fire hydrants X20 ea   Leg circles 2 min ea   squats X20 no UE use   Lunges  For hip flexor ROM x20 ea   Heel and toe raises X20 ea no UE use   Step ups X20 ea   Lateral step ups X20 ea   Hamstring curls    Hip hikes    Marching in place    Lateral pulsing to fatigue    NBOS balance with twisting and head turns     Overhead press    Rows     Lumbar rotation    Kickboard push/pull in semi squat    Kickboard pull down with back against wall    Tandem balance 2x30\" ea   Hamstring stretching    Hip flexor stretching    Calf stretching    Alternating toe taps to step X20 ea; and singles x20 ea       THERAPEUTIC EXERCISE: (0 minutes):  Exercises per grid below to improve mobility and strength.  Required moderate visual, verbal, manual and tactile cues to promote proper body alignment, promote proper body posture and promote proper body mechanics.  Progressed resistance, range, repetitions and complexity of movement as indicated.   Date:  10/09/24     Activity/Exercise Parameters   Hip stretching passively all directions Done    Nustep  4 min warm up   Heel and toe raises    Airex balance NBOS 2x1min   Airex balance EC 2x1min   Semi tandem on airex 1 min ea   3 way hip standing    Sit to stands chair plus airex no UE use SKTC stretch 30 sec ea   bridge 2x10   Seated marching X20 ea H/L   Fernando modified 30 sec ea   Shuttle  Hamstring stretch on step 30 sec ea   Walking march with light UE support    Toe taps to 6 in step X20 alternating, x20 ea singles   Ball squeeze  X20

## 2024-10-11 ENCOUNTER — HOSPITAL ENCOUNTER (OUTPATIENT)
Dept: PHYSICAL THERAPY | Age: 79
Setting detail: RECURRING SERIES
Discharge: HOME | End: 2024-10-14
Payer: MEDICARE

## 2024-10-11 PROCEDURE — 97113 AQUATIC THERAPY/EXERCISES: CPT

## 2024-10-11 ASSESSMENT — PAIN SCALES - GENERAL: PAINLEVEL_OUTOF10: 1

## 2024-10-11 NOTE — PROGRESS NOTES
Amy Solis Kristian  : 1945  Primary: Medicare Part A And B (Medicare)  Secondary:  FOR LIFE MEDICARE SUPP [facility] @ [place] [address] [address] [place] [address]  Phone: [phone]  Fax: [phone] Plan Frequency: 2x/wk for 90 days    Plan of Care/Certification Expiration Date: 25        Plan of Care/Certification Expiration Date:  Plan of Care/Certification Expiration Date: 25    Frequency/Duration:   Plan Frequency: 2x/wk for 90 days      Time In/Out:   Time In: 1351  Time Out: 1430      PT Visit Info:    Progress Note Counter: 2      Visit Count:  16    OUTPATIENT PHYSICAL THERAPY:   Treatment Note 10/11/2024       Episode  (L TONY)               Treatment Diagnosis:    Pain in left hip  Difficulty in walking, not elsewhere classified  Medical/Referring Diagnosis:    S/P total left hip arthroplasty  Presence of left artificial hip joint [Z96.642]   Referring Physician:  Stevie Freitas MD MD Orders:  PT Eval and Treat   Return MD Appt:    Date of Onset:  Onset Date: 24     Allergies:   Latex, Sulfa antibiotics, and Adhesive tape  Restrictions/Precautions:   Fall Precautions: fall risk      Interventions Planned (Treatment may consist of any combination of the following):     See Assessment Note    Subjective Comments:   Pt continues to have discomfort in her L hip when rising from a chair.  Initial Pain Level::     10  Post Session Pain Level:       /10  Medications Last Reviewed:  10/11/2024  Updated Objective Findings:  None Today  Treatment   Aquatic Therapy (39 minutes): Aquatic treatment performed per flow grid for Decreased muscle strength, Decreased endurance, Decreased static/dynamic balance and reactive control, Decreased range of motion, Decreased cardiovascular endurance, Decreased activity endurance, Decompression, Ease of movement, and Low impact and reduced weight bearing activity.  Cues provided for all exercise

## 2024-10-23 ENCOUNTER — HOSPITAL ENCOUNTER (OUTPATIENT)
Dept: PHYSICAL THERAPY | Age: 79
Setting detail: RECURRING SERIES
Discharge: HOME | End: 2024-10-26
Payer: MEDICARE

## 2024-10-23 PROCEDURE — 97113 AQUATIC THERAPY/EXERCISES: CPT

## 2024-10-23 ASSESSMENT — PAIN SCALES - GENERAL: PAINLEVEL_OUTOF10: 1

## 2024-10-23 NOTE — PROGRESS NOTES
Amy Solis Kristian  : 1945  Primary: Medicare Part A And B (Medicare)  Secondary:  FOR LIFE MEDICARE SUPP [facility] @ [place] [address] [address] [place] [address]  Phone: [phone]  Fax: [phone] Plan Frequency: 2x/wk for 90 days    Plan of Care/Certification Expiration Date: 25        Plan of Care/Certification Expiration Date:  Plan of Care/Certification Expiration Date: 25    Frequency/Duration:   Plan Frequency: 2x/wk for 90 days      Time In/Out:   Time In: 1330  Time Out: 1414      PT Visit Info:    Progress Note Counter: 3      Visit Count:  17    OUTPATIENT PHYSICAL THERAPY:   Treatment Note 10/23/2024       Episode  (L TONY)               Treatment Diagnosis:    Pain in left hip  Difficulty in walking, not elsewhere classified  Medical/Referring Diagnosis:    S/P total left hip arthroplasty  Presence of left artificial hip joint [Z96.642]   Referring Physician:  Stevie Freitas MD MD Orders:  PT Eval and Treat   Return MD Appt:    Date of Onset:  Onset Date: 24     Allergies:   Latex, Sulfa antibiotics, and Adhesive tape  Restrictions/Precautions:   Fall Precautions: fall risk      Interventions Planned (Treatment may consist of any combination of the following):     See Assessment Note    Subjective Comments:   Pt is doing okay, her hip bothers her at times but balance is improving.  Initial Pain Level::     10  Post Session Pain Level:       /10  Medications Last Reviewed:  10/23/2024  Updated Objective Findings:  None Today  Treatment   Aquatic Therapy (44 minutes): Aquatic treatment performed per flow grid for Decreased muscle strength, Decreased endurance, Decreased static/dynamic balance and reactive control, Decreased range of motion, Decreased cardiovascular endurance, Decreased activity endurance, Decompression, Ease of movement, and Low impact and reduced weight bearing activity.  Cues provided for all exercise

## 2024-10-25 ENCOUNTER — HOSPITAL ENCOUNTER (OUTPATIENT)
Dept: PHYSICAL THERAPY | Age: 79
Setting detail: RECURRING SERIES
Discharge: HOME | End: 2024-10-28
Payer: MEDICARE

## 2024-10-25 PROCEDURE — 97110 THERAPEUTIC EXERCISES: CPT

## 2024-10-25 ASSESSMENT — PAIN SCALES - GENERAL: PAINLEVEL_OUTOF10: 1

## 2024-10-25 NOTE — PROGRESS NOTES
Exercise Log  1 lap ~60 ft    Date:  10/25/24     Activity/Exercise Parameters   Forward walking 2 laps   Backward walking    Side stepping 2 laps   Walking march 1 lap   Walking hamstring curl 1 lap   [place] walking    [place]    Speed walking 1 lap   Side step squat    BIG walking  1 lap   Heel and toe walking 1 lap   Tandem walking    Walking with head turns and/or looking up and down 1 lap ea   Walking with kickboard movements    Hip flexion X20 ea   Hip abduction X20 ea   Hip extension X20 ea   Fire hydrants X20 ea   Leg circles 2 min ea   squats X20 no UE use   Lunges  For hip flexor ROM x20 ea   Heel and toe raises X20 ea no UE use   Step ups X20 ea   Lateral step ups X20 ea   Hamstring curls X20 ea   Hip hikes    Marching in place    Lateral pulsing to fatigue    NBOS balance with twisting and head turns     Overhead press    Rows     Lumbar rotation    Kickboard push/pull in semi squat    Kickboard pull down with back against wall    Tandem balance 2x30\" ea   Hamstring stretching    Hip flexor stretching    Calf stretching    Alternating toe taps to step X20 ea; and singles x20 ea       THERAPEUTIC EXERCISE: (44 minutes):  Exercises per grid below to improve mobility and strength.  Required moderate visual, verbal, manual and tactile cues to promote proper body alignment, promote proper body posture and promote proper body mechanics.  Progressed resistance, range, repetitions and complexity of movement as indicated.   Date:  10/25/24     Activity/Exercise Parameters   Hip stretching passively all directions Done    Nustep  5 min warm up   Heel and toe raises    Airex balance NBOS 2x1min   Airex balance EC 2x1min   Semi tandem on airex 1 min ea   3 way hip standing X10 ea   Sit to stands chair plus airex no UE use 2x10   bridge 2x10   Seated marching    Fernando modified 30 sec ea   Shuttle     Walking march with light UE support 3 laps bar   Toe taps to 6 in step X20 alternating, x20 ea singles   Ball

## 2024-10-29 ENCOUNTER — HOSPITAL ENCOUNTER (OUTPATIENT)
Dept: PHYSICAL THERAPY | Age: 79
Setting detail: RECURRING SERIES
Discharge: HOME | End: 2024-11-01
Payer: MEDICARE

## 2024-10-29 PROCEDURE — 97110 THERAPEUTIC EXERCISES: CPT

## 2024-10-29 ASSESSMENT — PAIN SCALES - GENERAL: PAINLEVEL_OUTOF10: 1

## 2024-10-29 NOTE — PROGRESS NOTES
abduction blue band H/L x30   DKTC stretch 1 min   JHONNY stretch 1 min L     Treatment/Session Summary:    Treatment Assessment:   Pt continues to show improvements in overall balance and strength. Her L hip continues to fatigue and show more tightness than her R.   Communication/Consultation:  None today  Equipment provided today:  None  Recommendations/Intent for next treatment session: Next visit will focus on strengthening, gait, balance and ROM.    >Total Treatment Billable Duration:  40 minutes   Time In: 1430  Time Out: 1514    Veronica Daigle PT         Charge Capture  Events  [employer] Portal  Appt Desk  Attendance Report     Future Appointments   Date Time Provider Department Center   11/1/2024  2:30 PM Veronica Daigle, PT [facility]   11/4/2024  3:15 PM Veronica aDigle, PT [facility]   11/8/2024 12:15 PM Veronica Daigle, PT [facility]   11/11/2024  2:30 PM Veronica Daigle, PT [facility]   11/12/2024  8:15 AM Mary Rodrigues MD [facility]   11/15/2024 12:15 PM Veronica Daigle, PT [facility]   11/18/2024  2:15 PM Veronica Daigle, PT [facility]   11/20/2024  2:15 PM Veronica Daigle, PT [facility]   11/25/2024  2:15 PM Veronica Daigle, PT [facility]

## 2024-11-01 ENCOUNTER — HOSPITAL ENCOUNTER (EMERGENCY)
Age: 79
Discharge: HOME | End: 2024-11-01
Attending: EMERGENCY MEDICINE
Payer: MEDICARE

## 2024-11-01 ENCOUNTER — APPOINTMENT (OUTPATIENT)
Dept: PHYSICAL THERAPY | Age: 79
End: 2024-11-01
Payer: MEDICARE

## 2024-11-01 ENCOUNTER — APPOINTMENT (OUTPATIENT)
Dept: GENERAL RADIOLOGY | Age: 79
End: 2024-11-01
Payer: MEDICARE

## 2024-11-01 ENCOUNTER — APPOINTMENT (OUTPATIENT)
Dept: ULTRASOUND IMAGING | Age: 79
End: 2024-11-01
Payer: MEDICARE

## 2024-11-01 VITALS
HEIGHT: 67 IN | BODY MASS INDEX: 22.29 KG/M2 | HEART RATE: 63 BPM | TEMPERATURE: 97.5 F | OXYGEN SATURATION: 94 % | WEIGHT: 142 LBS | SYSTOLIC BLOOD PRESSURE: 140 MMHG | RESPIRATION RATE: 17 BRPM | DIASTOLIC BLOOD PRESSURE: 84 MMHG

## 2024-11-01 DIAGNOSIS — S86.891A RIGHT MEDIAL TIBIAL STRESS SYNDROME, INITIAL ENCOUNTER: ICD-10-CM

## 2024-11-01 DIAGNOSIS — M79.604 RIGHT LEG PAIN: Primary | ICD-10-CM

## 2024-11-01 PROCEDURE — 73590 X-RAY EXAM OF LOWER LEG: CPT

## 2024-11-01 PROCEDURE — 93971 EXTREMITY STUDY: CPT

## 2024-11-01 PROCEDURE — 6370000000 HC RX 637 (ALT 250 FOR IP): Performed by: EMERGENCY MEDICINE

## 2024-11-01 PROCEDURE — 99284 EMERGENCY DEPT VISIT MOD MDM: CPT

## 2024-11-01 RX ORDER — ONDANSETRON 8 MG/1
8 TABLET, ORALLY DISINTEGRATING ORAL ONCE
Status: COMPLETED | OUTPATIENT
Start: 2024-11-01 | End: 2024-11-01

## 2024-11-01 RX ORDER — OXYCODONE HYDROCHLORIDE 5 MG/1
5 TABLET ORAL
Status: COMPLETED | OUTPATIENT
Start: 2024-11-01 | End: 2024-11-01

## 2024-11-01 RX ORDER — DICLOFENAC POTASSIUM 50 MG/1
50 TABLET, FILM COATED ORAL 3 TIMES DAILY PRN
Qty: 60 TABLET | Refills: 0 | Status: SHIPPED | OUTPATIENT
Start: 2024-11-01

## 2024-11-01 RX ADMIN — OXYCODONE 5 MG: 5 TABLET ORAL at 13:19

## 2024-11-01 RX ADMIN — ONDANSETRON 8 MG: 8 TABLET, ORALLY DISINTEGRATING ORAL at 13:19

## 2024-11-01 ASSESSMENT — PAIN DESCRIPTION - DESCRIPTORS: DESCRIPTORS: SHARP

## 2024-11-01 ASSESSMENT — ENCOUNTER SYMPTOMS
EYE REDNESS: 0
COLOR CHANGE: 0
ABDOMINAL PAIN: 0
VOICE CHANGE: 0
SHORTNESS OF BREATH: 0
TROUBLE SWALLOWING: 0
VOMITING: 0
BACK PAIN: 0
CHEST TIGHTNESS: 0

## 2024-11-01 ASSESSMENT — PAIN - FUNCTIONAL ASSESSMENT: PAIN_FUNCTIONAL_ASSESSMENT: 0-10

## 2024-11-01 ASSESSMENT — PAIN SCALES - GENERAL
PAINLEVEL_OUTOF10: 10
PAINLEVEL_OUTOF10: 4
PAINLEVEL_OUTOF10: 10

## 2024-11-01 ASSESSMENT — PAIN DESCRIPTION - ORIENTATION: ORIENTATION: RIGHT

## 2024-11-01 ASSESSMENT — PAIN DESCRIPTION - PAIN TYPE: TYPE: ACUTE PAIN

## 2024-11-01 ASSESSMENT — PAIN DESCRIPTION - LOCATION: LOCATION: LEG

## 2024-11-01 NOTE — ED TRIAGE NOTES
Pt via wheelchair to triage for reports of R lower leg pain. Pt states she was walking outside when her leg suddenly gave out on her. Pt reports sharp pain to anterior lower leg. Pt reports hx of parkinsons & states she has had blood clot in the past but to L leg. Pt states she took 1g tylenol this morning prior to arrival. Pt reports 1/10 pain at rest but 10/10 when attempting to ambulate.

## 2024-11-01 NOTE — ED PROVIDER NOTES
Emergency Department Provider Note       PCP: Marguerite Chang MD   Age: 79 y.o.   Sex: female     DISPOSITION Decision To Discharge 11/01/2024 02:33:01 PM    ICD-10-CM    1. Right leg pain  M79.604       2. Right medial tibial stress syndrome, initial encounter  S86.891A           Medical Decision Making     Patient peers to have most tenderness on the anterior aspect of her right lower leg.  Equal distal pulses.  No signs of infection.  No obvious signs of trauma.  Given her history of DVT will obtain ultrasound to rule out DVT.  Also will obtain x-ray to rule out any obvious pathological fracture    2:35 PM EDT  Ultrasound shows no DVT.  X-ray shows no fracture.  Pain has improved after being treated with pain medication here.  She is now able to stand and ambulate with less pain.  Will discharge home.     1 acute complicated illness or injury.  Shared medical decision making was utilized in creating the patients health plan today.  I independently ordered and reviewed each unique test.    I reviewed external records: ED visit note from an outside group.  I reviewed external records: provider visit note from PCP.  I reviewed external records: provider visit note from outside specialist.  I reviewed external records: previous EKG including cardiologist interpretation.    I reviewed external records: previous lab results from outside ED.  I reviewed external records: previous imaging study including radiologist interpretation.       I interpreted the X-rays no acute fracture.  I interpreted the Ultrasound  no DVT.              History     Patient presents ER complaint of pain in her right lower leg.  Patient states after walking back to the house she had acute sharp pain in her right lower leg.  States pain was to the point that she could barely walk.  Denies any direct trauma.  Denies any numbness.  Voices concern as she has history of previous DVT in the left leg.  Denies any hip pain.    The history is     COLONOSCOPY      several    EXTRAC ERUPTED TOOTH/EXPOSED ROOT      SHOULDER SURGERY Right 05/08/2023    RIGHT SHOULDER TOTAL ARTHROPLASTY REVERSE -REGIONAL BLOCK -23 HOUR OBSERVATION performed by TROY Ramirez MD at [facility]    TONSILLECTOMY  1949        Social History     Socioeconomic History    Marital status:    Tobacco Use    Smoking status: Never     Passive exposure: Past (parents and spouse)    Smokeless tobacco: Never   Vaping Use    Vaping status: Never Used   Substance and Sexual Activity    Alcohol use: Yes     Comment: once a week    Drug use: No     Social Determinants of Health     Financial Resource Strain: Low Risk  (2/27/2024)    Received from [employer]    Financial Resource Strain     Difficulty Paying Living Expenses: Not hard at all     Difficulty Paying Medical Expenses: No   Food Insecurity: No Food Insecurity (2/27/2024)    Received from [facility]    Food Insecurity     Worried about Running Out of Food in the Last Year: Never true     Ran Out of Food in the Last Year: Never true   Transportation Needs: No Transportation Needs (2/27/2024)    Received from [facility]    Transportation Needs     Lack of Transportation: No   Physical Activity: Inactive (2/27/2024)    Received from [facility]    Physical Activity     Days of Exercise per Week: 0     Minutes of Exercise per Session: 0     Total Minutes of Exercise per Week: 0   Stress: No Stress Concern Present (2/27/2024)    Received from [facility]    Stress     Feeling of Stress : Only a little   Social Connections: Socially Integrated (2/27/2024)    Received from [facility]    Social Connections     Frequency of Communication with Friends and Family: More than three times a week     Frequency of Social Gatherings with Friends and Family: More than three times a week   Intimate Partner Violence: Not At Risk (2/27/2024)

## 2024-11-04 ENCOUNTER — HOSPITAL ENCOUNTER (OUTPATIENT)
Dept: PHYSICAL THERAPY | Age: 79
Setting detail: RECURRING SERIES
End: 2024-11-04
Payer: MEDICARE

## 2024-11-08 ENCOUNTER — HOSPITAL ENCOUNTER (OUTPATIENT)
Dept: PHYSICAL THERAPY | Age: 79
Setting detail: RECURRING SERIES
Discharge: HOME | End: 2024-11-11
Payer: MEDICARE

## 2024-11-08 PROCEDURE — 97113 AQUATIC THERAPY/EXERCISES: CPT

## 2024-11-08 ASSESSMENT — PAIN SCALES - GENERAL: PAINLEVEL_OUTOF10: 2

## 2024-11-08 NOTE — PROGRESS NOTES
Amy Solis Kristian  : 1945  Primary: Medicare Part A And B (Medicare)  Secondary:  FOR LIFE MEDICARE SUPP [facility] @ [place] [address] [address] [place] [address]  Phone: [phone]  Fax: [phone] Plan Frequency: 2x/wk for 90 days    Plan of Care/Certification Expiration Date: 25        Plan of Care/Certification Expiration Date:  Plan of Care/Certification Expiration Date: 25    Frequency/Duration:   Plan Frequency: 2x/wk for 90 days      Time In/Out:   Time In: 1220  Time Out: 1259      PT Visit Info:    Progress Note Counter: 6      Visit Count:  20    OUTPATIENT PHYSICAL THERAPY:   Treatment Note 2024       Episode  (L TONY)               Treatment Diagnosis:    Pain in left hip  Difficulty in walking, not elsewhere classified  Medical/Referring Diagnosis:    S/P total left hip arthroplasty  Presence of left artificial hip joint [Z96.642]   Referring Physician:  Stevie Freitas MD MD Orders:  PT Eval and Treat   Return MD Appt:    Date of Onset:  Onset Date: 24     Allergies:   Latex, Sulfa antibiotics, and Adhesive tape  Restrictions/Precautions:   Fall Precautions: fall risk      Interventions Planned (Treatment may consist of any combination of the following):     See Assessment Note    Subjective Comments:   Pt reports she thinks she is having a vascular problem in her lower leg that is presenting like shin splints. She will see a vascular PA next week.  Initial Pain Level::     2/10  Post Session Pain Level:       2/10  Medications Last Reviewed:  2024  Updated Objective Findings:  None Today  Treatment   Aquatic Therapy (38 minutes): Aquatic treatment performed per flow grid for Decreased muscle strength, Decreased endurance, Decreased static/dynamic balance and reactive control, Decreased range of motion, Decreased cardiovascular endurance, Decreased activity endurance, Decompression, Ease of movement, and Low impact

## 2024-11-11 ENCOUNTER — HOSPITAL ENCOUNTER (OUTPATIENT)
Dept: PHYSICAL THERAPY | Age: 79
Setting detail: RECURRING SERIES
Discharge: HOME | End: 2024-11-14
Payer: MEDICARE

## 2024-11-11 PROCEDURE — 97113 AQUATIC THERAPY/EXERCISES: CPT

## 2024-11-11 ASSESSMENT — PAIN SCALES - GENERAL: PAINLEVEL_OUTOF10: 1

## 2024-11-11 NOTE — PROGRESS NOTES
6 in step X20 alternating, x20 ea singles   Ball squeeze  X30   Hip abduction blue band H/L x30   DKTC stretch 1 min   JHONNY stretch 1 min L     Treatment/Session Summary:    Treatment Assessment:   Pt continues to ambulate with decreased stability and control often with \"noodle like\" movements so continuing to emphasize stability and control and in the water with exercises.  Communication/Consultation:  None today  Equipment provided today:  None  Recommendations/Intent for next treatment session: Next visit will focus on strengthening, gait, balance and ROM.    >Total Treatment Billable Duration:  45 minutes   Time In: 1430  Time Out: 1515    Veronica Daigle PT         Charge Capture  Events  [employer] Portal  Appt Desk  Attendance Report     Future Appointments   Date Time Provider Department Center   11/12/2024  8:15 AM Mary Rodrigues MD [facility]   11/15/2024 12:15 PM Veronica Daigle, PT [facility]   11/18/2024  2:15 PM Veronica Daigle PT [facility]   11/20/2024  2:15 PM Veronica Daigle PT [facility]   11/25/2024  2:15 PM Veronica Daigle, PT [facility]

## 2024-11-11 NOTE — PROGRESS NOTES
tablet by mouth       No current facility-administered medications on file prior to visit.       Allergies   Allergen Reactions    Latex Other (See Comments)     Red and bumpy skin    Sulfa Antibiotics Nausea And Vomiting and Nausea Only    Adhesive Tape Other (See Comments)     Red and bumpy skin           Physical Examination    Vitals:    11/12/24 0823   BP: 116/78   Pulse: 68   SpO2: 95%         Neurologic Exam      Motor Examination: Last dose of sinemet was 1 hrs ago.      Voice tremor       Chin tremor         RIGHT LEFT   Tremor at rest 0 0   Postural Tremor of hands 0 0   Action Tremor of hands 0 0   Tremor of Lower extremity 0 0   Open/Close 0 0   Rapid Alternating Movements of Hands 0 0   Finger Taps 0 0   Rigidity - Upper extremity 0 1   Rigidity- Lower extremity  2 2   Foot tapping/LE agility 1 1 L>R      Hypophonia 2   Hypomimia 1   Arising from Chair WNL   Posture Slight lean to the right   Gait Good stride and alma   Pull test 2+   Dyskinesia  absent   Body Bradykinsesia 2        Assessment/Plan:   Diagnosis Orders   1. Parkinson's disease without dyskinesia, with fluctuating manifestations (HCC)        2. Encounter for long-term (current) use of high-risk medication        3. Tremor            Tremor predominant PD H&Y stage 2 that is stable on current regimen. Has improved since last visit likely due to exercise program.     I have spent 60 minute visit  in counseling for importance of exercise,  medications and education of disease and disease progression. We discussed exercise and how to adjust sinemet for wearing off at times.      Patient is to continue all other medications as directed by prescribing physicians unless addressed above in plan. Continuation of these medications from today's visit are made based on the patient's report of current medications.     Mary Rodrigues MD  [facility] Neurology  Director Movement Disorders Program  [facility]  2

## 2024-11-12 ENCOUNTER — OFFICE VISIT (OUTPATIENT)
Dept: NEUROLOGY | Age: 79
End: 2024-11-12
Payer: MEDICARE

## 2024-11-12 VITALS
SYSTOLIC BLOOD PRESSURE: 116 MMHG | BODY MASS INDEX: 23.02 KG/M2 | OXYGEN SATURATION: 95 % | DIASTOLIC BLOOD PRESSURE: 78 MMHG | HEART RATE: 68 BPM | WEIGHT: 147 LBS

## 2024-11-12 DIAGNOSIS — R25.1 TREMOR: ICD-10-CM

## 2024-11-12 DIAGNOSIS — Z79.899 ENCOUNTER FOR LONG-TERM (CURRENT) USE OF HIGH-RISK MEDICATION: ICD-10-CM

## 2024-11-12 DIAGNOSIS — G20.A2 PARKINSON'S DISEASE WITHOUT DYSKINESIA, WITH FLUCTUATING MANIFESTATIONS (HCC): Primary | ICD-10-CM

## 2024-11-12 PROCEDURE — 1159F MED LIST DOCD IN RCRD: CPT | Performed by: PSYCHIATRY & NEUROLOGY

## 2024-11-12 PROCEDURE — 1160F RVW MEDS BY RX/DR IN RCRD: CPT | Performed by: PSYCHIATRY & NEUROLOGY

## 2024-11-12 PROCEDURE — G8427 DOCREV CUR MEDS BY ELIG CLIN: HCPCS | Performed by: PSYCHIATRY & NEUROLOGY

## 2024-11-12 PROCEDURE — 1090F PRES/ABSN URINE INCON ASSESS: CPT | Performed by: PSYCHIATRY & NEUROLOGY

## 2024-11-12 PROCEDURE — G2211 COMPLEX E/M VISIT ADD ON: HCPCS | Performed by: PSYCHIATRY & NEUROLOGY

## 2024-11-12 PROCEDURE — G8399 PT W/DXA RESULTS DOCUMENT: HCPCS | Performed by: PSYCHIATRY & NEUROLOGY

## 2024-11-12 PROCEDURE — 1123F ACP DISCUSS/DSCN MKR DOCD: CPT | Performed by: PSYCHIATRY & NEUROLOGY

## 2024-11-12 PROCEDURE — 1036F TOBACCO NON-USER: CPT | Performed by: PSYCHIATRY & NEUROLOGY

## 2024-11-12 PROCEDURE — G8420 CALC BMI NORM PARAMETERS: HCPCS | Performed by: PSYCHIATRY & NEUROLOGY

## 2024-11-12 PROCEDURE — G8484 FLU IMMUNIZE NO ADMIN: HCPCS | Performed by: PSYCHIATRY & NEUROLOGY

## 2024-11-12 PROCEDURE — 99215 OFFICE O/P EST HI 40 MIN: CPT | Performed by: PSYCHIATRY & NEUROLOGY

## 2024-11-12 RX ORDER — CARBIDOPA AND LEVODOPA 50; 200 MG/1; MG/1
TABLET, EXTENDED RELEASE ORAL
Qty: 180 TABLET | Refills: 3 | Status: SHIPPED | OUTPATIENT
Start: 2024-11-12

## 2024-11-12 ASSESSMENT — ENCOUNTER SYMPTOMS
BACK PAIN: 0
CONSTIPATION: 0

## 2024-11-15 ENCOUNTER — HOSPITAL ENCOUNTER (OUTPATIENT)
Dept: PHYSICAL THERAPY | Age: 79
Setting detail: RECURRING SERIES
Discharge: HOME | End: 2024-11-18
Payer: MEDICARE

## 2024-11-15 PROCEDURE — 97113 AQUATIC THERAPY/EXERCISES: CPT

## 2024-11-15 ASSESSMENT — PAIN SCALES - GENERAL: PAINLEVEL_OUTOF10: 1

## 2024-11-15 NOTE — PROGRESS NOTES
lap ~60 ft    Date:  11/15/24     Activity/Exercise Parameters   Forward walking 2 laps   Backward walking    Side stepping 2 laps   Walking march 1 lap   Walking hamstring curl 1 lap   [place] walking    [place]    Speed walking 1 lap   Side step squat    BIG walking  1 lap   Heel and toe walking 1 lap   Tandem walking    Walking with head turns and/or looking up and down 1 lap ea   Walking with kickboard movements    Hip flexion X20 ea   Hip abduction X20 ea   Hip extension X20 ea   Fire hydrants X20 ea   Leg circles 2 min ea   squats X20 no UE use   Lunges  For hip flexor ROM x20 ea   Heel and toe raises X20 ea no UE use   Step ups X20 ea   Lateral step ups X20 ea   Hamstring curls X20 ea   Hip hikes    Marching in place    Lateral pulsing to fatigue    NBOS balance with twisting and head turns     Overhead press    Rows     Lumbar rotation    Kickboard push/pull in semi squat    Kickboard pull down with back against wall    Tandem balance 2x30\" ea   Hamstring stretching    Hip flexor stretching    Calf stretching    Alternating toe taps to step X20 ea; and singles x20 ea       THERAPEUTIC EXERCISE: (0 minutes):  Exercises per grid below to improve mobility and strength.  Required moderate visual, verbal, manual and tactile cues to promote proper body alignment, promote proper body posture and promote proper body mechanics.  Progressed resistance, range, repetitions and complexity of movement as indicated.   Date:  11/15/24     Activity/Exercise Parameters   Hip stretching passively all directions Done    Nustep  4 min warm up   Heel and toe raises    Airex balance NBOS 2x1min   Airex balance EC 2x1min   Semi tandem on airex 1 min ea   3 way hip standing X10 ea   Sit to stands chair plus airex no UE use 2x10   bridge 2x10   Seated marching    Fernando modified 30 sec ea   Shuttle     Walking march with light UE support 3 laps bar   Toe taps to 6 in step X20 alternating, x20 ea singles   Ball squeeze  X30   Hip

## 2024-11-18 ENCOUNTER — HOSPITAL ENCOUNTER (OUTPATIENT)
Dept: PHYSICAL THERAPY | Age: 79
Setting detail: RECURRING SERIES
Discharge: HOME | End: 2024-11-21
Payer: MEDICARE

## 2024-11-18 PROCEDURE — 97113 AQUATIC THERAPY/EXERCISES: CPT

## 2024-11-18 NOTE — PROGRESS NOTES
Amy Solis Kristian  : 1945  Primary: Medicare Part A And B (Medicare)  Secondary:  FOR LIFE MEDICARE SUPP [facility] @ [place] [address] [address] [place] [address]  Phone: [phone]  Fax: [phone] Plan Frequency: 2x/wk for 90 days    Plan of Care/Certification Expiration Date: 25        Plan of Care/Certification Expiration Date:  Plan of Care/Certification Expiration Date: 25    Frequency/Duration:   Plan Frequency: 2x/wk for 90 days      Time In/Out:   Time In: 1415  Time Out: 1500      PT Visit Info:    Progress Note Counter: 8      Visit Count:  23    OUTPATIENT PHYSICAL THERAPY:   Treatment Note 2024       Episode  (L TONY)               Treatment Diagnosis:    Pain in left hip  Difficulty in walking, not elsewhere classified  Medical/Referring Diagnosis:    S/P total left hip arthroplasty  Presence of left artificial hip joint [Z96.642]   Referring Physician:  Stevie Freitas MD MD Orders:  PT Eval and Treat   Return MD Appt:    Date of Onset:  Onset Date: 24     Allergies:   Latex, Sulfa antibiotics, and Adhesive tape  Restrictions/Precautions:   Fall Precautions: fall risk      Interventions Planned (Treatment may consist of any combination of the following):     See Assessment Note    Subjective Comments:   Pt reports her shin is still hurting her but she has been wearing her compression stockings.  Initial Pain Level::     1/10  Post Session Pain Level:       1/10  Medications Last Reviewed:  2024  Updated Objective Findings:  None Today  Treatment   Aquatic Therapy (45 minutes): Aquatic treatment performed per flow grid for Decreased muscle strength, Decreased endurance, Decreased static/dynamic balance and reactive control, Decreased range of motion, Decreased cardiovascular endurance, Decreased activity endurance, Decompression, Ease of movement, and Low impact and reduced weight bearing activity.  Cues provided

## 2024-11-19 ASSESSMENT — PAIN SCALES - GENERAL: PAINLEVEL_OUTOF10: 1

## 2024-11-20 ENCOUNTER — HOSPITAL ENCOUNTER (OUTPATIENT)
Dept: PHYSICAL THERAPY | Age: 79
Setting detail: RECURRING SERIES
Discharge: HOME | End: 2024-11-23
Payer: MEDICARE

## 2024-11-20 PROCEDURE — 97110 THERAPEUTIC EXERCISES: CPT

## 2024-11-20 ASSESSMENT — PAIN SCALES - GENERAL: PAINLEVEL_OUTOF10: 1

## 2024-11-20 NOTE — PROGRESS NOTES
Amy Solis Townsend  : 1945  Primary: Medicare Part A And B (Medicare)  Secondary:  FOR LIFE MEDICARE SUPP [facility] @ [place]  [place] [address]  Phone: [phone]  Fax: [phone] Plan Frequency: 2x/wk for 90 days    Plan of Care/Certification Expiration Date: 25        Plan of Care/Certification Expiration Date:  Plan of Care/Certification Expiration Date: 25    Frequency/Duration: Plan Frequency: 2x/wk for 90 days      Time In/Out:   Time In: 1415  Time Out: 1459      PT Visit Info:    Progress Note Counter: 9      Visit Count:  24                OUTPATIENT PHYSICAL THERAPY:             Progress Report 2024               Episode (L TONY)         Treatment Diagnosis:     Pain in left hip  Difficulty in walking, not elsewhere classified  Medical/Referring Diagnosis:    S/P total left hip arthroplasty  Presence of left artificial hip joint [Z96.642]   Referring Physician:  Stevie Freitas MD MD Orders:  PT Eval and Treat   Return MD Appt:     Date of Onset:  Onset Date: 24     Allergies:  Latex, Sulfa antibiotics, and Adhesive tape  Restrictions/Precautions:    None      Medications Last Reviewed:  2024     SUBJECTIVE   History of Injury/Illness (Reason for Referral):  Pt presents s/p L TONY performed in May 2024. She also has Parkinsons. She does okay with PD unless she doesn't take her sinemet on time in which case she cramps. Her goals for therapy including improved gait, balance, strength and ROM. Hx of R reverse TSA in May 2023. She got a blood clot after this surgery (she is now off blood thinners). She has had no falls in the past year. Her balance is most challenged when on uneven surfaces.   24: Pt reports she was hardly able to move on Monday which is why she canceled her appointment. Her hip continues to be stiff and painful when first getting up from a chair.  10/9/24: Pt continues to have

## 2024-11-20 NOTE — PROGRESS NOTES
Amy Solis Kristian  : 1945  Primary: Medicare Part A And B (Medicare)  Secondary:  FOR LIFE MEDICARE SUPP [facility] @ [place] [address] [address] [place] [place] [address]  Phone: [phone]  Fax: [phone] Plan Frequency: 2x/wk for 90 days    Plan of Care/Certification Expiration Date: 25        Plan of Care/Certification Expiration Date:  Plan of Care/Certification Expiration Date: 25    Frequency/Duration:   Plan Frequency: 2x/wk for 90 days      Time In/Out:   Time In: 1415  Time Out: 1459      PT Visit Info:    Progress Note Counter: 9      Visit Count:  24    OUTPATIENT PHYSICAL THERAPY:   Treatment Note 2024       Episode  (L TONY)               Treatment Diagnosis:    Pain in left hip  Difficulty in walking, not elsewhere classified  Medical/Referring Diagnosis:    S/P total left hip arthroplasty  Presence of left artificial hip joint [Z96.642]   Referring Physician:  Stevie Freitas MD MD Orders:  PT Eval and Treat   Return MD Appt:    Date of Onset:  Onset Date: 24     Allergies:   Latex, Sulfa antibiotics, and Adhesive tape  Restrictions/Precautions:   Fall Precautions: fall risk      Interventions Planned (Treatment may consist of any combination of the following):     See Assessment Note    Subjective Comments:   See PN  Initial Pain Level::     1/10  Post Session Pain Level:       1/10  Medications Last Reviewed:  2024  Updated Objective Findings:  None Today  Treatment   Aquatic Therapy (0 minutes): Aquatic treatment performed per flow grid for Decreased muscle strength, Decreased endurance, Decreased static/dynamic balance and reactive control, Decreased range of motion, Decreased cardiovascular endurance, Decreased activity endurance, Decompression, Ease of movement, and Low impact and reduced weight bearing activity.  Cues provided for all exercise technique.     Aquatic Exercise Log  1 lap ~60 ft    Date:  24

## 2024-11-25 ENCOUNTER — HOSPITAL ENCOUNTER (OUTPATIENT)
Dept: PHYSICAL THERAPY | Age: 79
Setting detail: RECURRING SERIES
Discharge: HOME | End: 2024-11-28
Payer: MEDICARE

## 2024-11-25 PROCEDURE — 97113 AQUATIC THERAPY/EXERCISES: CPT

## 2024-11-25 ASSESSMENT — PAIN SCALES - GENERAL: PAINLEVEL_OUTOF10: 2

## 2024-11-25 NOTE — PROGRESS NOTES
Amy Solis Townsend  : 1945  Primary: Medicare Part A And B (Medicare)  Secondary:  FOR LIFE MEDICARE SUPP [facility] @ [place] [address] [address] [place] [address]  Phone: [phone]  Fax: [phone] Plan Frequency: 2x/wk for 90 days    Plan of Care/Certification Expiration Date: 25        Plan of Care/Certification Expiration Date:  Plan of Care/Certification Expiration Date: 25    Frequency/Duration:   Plan Frequency: 2x/wk for 90 days      Time In/Out:   Time In: 1415  Time Out: 1500      PT Visit Info:    Progress Note Counter: 1      Visit Count:  25    OUTPATIENT PHYSICAL THERAPY:   Treatment Note 2024       Episode  (L TONY)               Treatment Diagnosis:    Pain in left hip  Difficulty in walking, not elsewhere classified  Medical/Referring Diagnosis:    S/P total left hip arthroplasty  Presence of left artificial hip joint [Z96.642]   Referring Physician:  Stevie Freitas MD MD Orders:  PT Eval and Treat   Return MD Appt:    Date of Onset:  Onset Date: 24     Allergies:   Latex, Sulfa antibiotics, and Adhesive tape  Restrictions/Precautions:   Fall Precautions: fall risk      Interventions Planned (Treatment may consist of any combination of the following):     See Assessment Note    Subjective Comments:   Pt reports her R shin is still bothering her.   Initial Pain Level::     2/10  Post Session Pain Level:       2/10  Medications Last Reviewed:  2024  Updated Objective Findings:  None Today  Treatment   Aquatic Therapy (45 minutes): Aquatic treatment performed per flow grid for Decreased muscle strength, Decreased endurance, Decreased static/dynamic balance and reactive control, Decreased range of motion, Decreased cardiovascular endurance, Decreased activity endurance, Decompression, Ease of movement, and Low impact and reduced weight bearing activity.  Cues provided for all exercise technique.     Aquatic

## 2024-12-05 ENCOUNTER — HOSPITAL ENCOUNTER (OUTPATIENT)
Dept: PHYSICAL THERAPY | Age: 79
Setting detail: RECURRING SERIES
Discharge: HOME | End: 2024-12-08
Payer: MEDICARE

## 2024-12-05 PROCEDURE — 97110 THERAPEUTIC EXERCISES: CPT

## 2024-12-05 NOTE — PROGRESS NOTES
Amy Solis Kristian  : 1945  Primary: Medicare Part A And B (Medicare)  Secondary:  FOR LIFE MEDICARE SUPP [facility] @ [place] [address] [address] [place] [address]  Phone: [phone]  Fax: [phone] Plan Frequency: 2x/wk for 90 days    Plan of Care/Certification Expiration Date: 25        Plan of Care/Certification Expiration Date:  Plan of Care/Certification Expiration Date: 25    Frequency/Duration:   Plan Frequency: 2x/wk for 90 days      Time In/Out:   Time In: 1430  Time Out: 1528      PT Visit Info:    Progress Note Counter: 1      Visit Count:  26    OUTPATIENT PHYSICAL THERAPY:   Treatment Note 2024       Episode  (L TONY)               Treatment Diagnosis:    Pain in left hip  Difficulty in walking, not elsewhere classified  Medical/Referring Diagnosis:    S/P total left hip arthroplasty  Presence of left artificial hip joint [Z96.642]   Referring Physician:  Stevie Freitas MD MD Orders:  PT Eval and Treat   Return MD Appt:    Date of Onset:  Onset Date: 24     Allergies:   Latex, Sulfa antibiotics, and Adhesive tape  Restrictions/Precautions:   Fall Precautions: fall risk      Interventions Planned (Treatment may consist of any combination of the following):     See Assessment Note    Subjective Comments:   Patient reports that she consistently has pain with initial standing from the car, then it goes away.  Initial Pain Level::  0    /10. Minimal soreness at distal R knee throughout session.   Post Session Pain Level:       0 /10  Medications Last Reviewed:  2024  Updated Objective Findings:   loss of balance to the L during standing balance training; corrected with minimal assistance and hip strategy. No fall.  Treatment   Aquatic Therapy (0 minutes): NONE TODAY Aquatic treatment performed per flow grid for Decreased muscle strength, Decreased endurance, Decreased static/dynamic balance and reactive control, Decreased

## 2024-12-12 ENCOUNTER — HOSPITAL ENCOUNTER (OUTPATIENT)
Dept: PHYSICAL THERAPY | Age: 79
Setting detail: RECURRING SERIES
Discharge: HOME | End: 2024-12-15
Payer: MEDICARE

## 2024-12-12 PROCEDURE — 97110 THERAPEUTIC EXERCISES: CPT

## 2024-12-12 NOTE — PROGRESS NOTES
Amy Solis Townsend  : 1945  Primary: Medicare Part A And B (Medicare)  Secondary:  FOR LIFE MEDICARE SUPP [facility] @ [place] [address] [address] [place] [address]  Phone: [phone]  Fax: [phone] Plan Frequency: 2x/wk for 90 days    Plan of Care/Certification Expiration Date: 25        Plan of Care/Certification Expiration Date:  Plan of Care/Certification Expiration Date: 25    Frequency/Duration:   Plan Frequency: 2x/wk for 90 days      Time In/Out:   Time In: 1533  Time Out: 1630      PT Visit Info:    Progress Note Counter: 1      Visit Count:  27    OUTPATIENT PHYSICAL THERAPY:   Treatment Note 2024       Episode  (L TONY)               Treatment Diagnosis:    Pain in left hip  Difficulty in walking, not elsewhere classified  Medical/Referring Diagnosis:    S/P total left hip arthroplasty  Presence of left artificial hip joint [Z96.642]   Referring Physician:  Stevie Freitas MD MD Orders:  PT Eval and Treat   Return MD Appt:    Date of Onset:  Onset Date: 24     Allergies:   Latex, Sulfa antibiotics, and Adhesive tape  Restrictions/Precautions:   Fall Precautions: fall risk      Interventions Planned (Treatment may consist of any combination of the following):     See Assessment Note    Subjective Comments: Patient reports ongoing leg pain; suspected due to the history of blood clot. She has an appointment to see vascular MD in 2025.  Initial Pain Level::  0    /10.   Post Session Pain Level:       0 /10  Medications Last Reviewed:  2024  Updated Objective Findings:   occasional deviation to the L during gait.  Treatment   Aquatic Therapy (0 minutes): NONE TODAY Aquatic treatment performed per flow grid for Decreased muscle strength, Decreased endurance, Decreased static/dynamic balance and reactive control, Decreased range of motion, Decreased cardiovascular endurance, Decreased activity endurance,

## 2024-12-18 ENCOUNTER — HOSPITAL ENCOUNTER (OUTPATIENT)
Dept: PHYSICAL THERAPY | Age: 79
Setting detail: RECURRING SERIES
Discharge: HOME | End: 2024-12-21
Payer: MEDICARE

## 2024-12-18 PROCEDURE — 97113 AQUATIC THERAPY/EXERCISES: CPT

## 2024-12-18 ASSESSMENT — PAIN SCALES - GENERAL: PAINLEVEL_OUTOF10: 1

## 2024-12-18 NOTE — THERAPY DISCHARGE
Amy Tonwsend  : 1945  Primary: Medicare Part A And B (Medicare)  Secondary:  FOR LIFE MEDICARE SUPP [facility] [place]  [place] [address]  Phone: [phone]  Fax: [phone] Plan Frequency: 2x/wk for 90 days    Plan of Care/Certification Expiration Date: 25        Plan of Care/Certification Expiration Date:  Plan of Care/Certification Expiration Date: 25      Time In/Out:   Time In: 1550  Time Out: 1630      PT Visit Info:    Progress Note Counter: 1      Visit Count:  28                OUTPATIENT PHYSICAL THERAPY:             Discharge Summary 2024               Episode (L TONY)         Treatment Diagnosis:     Pain in left hip  Difficulty in walking, not elsewhere classified  Medical/Referring Diagnosis:    S/P total left hip arthroplasty  Presence of left artificial hip joint [Z96.642]   Referring Physician:  Stevie Freitas MD MD Orders:  PT Eval and Treat   Return MD Appt:  , neurologist   Date of Onset:  Onset Date: 24     Allergies:  Latex, Sulfa antibiotics, and Adhesive tape  Restrictions/Precautions:    None      Medications Last Reviewed:  2024     SUBJECTIVE   Pt reports she is doing fine today.        OBJECTIVE        ASSESSMENT    Assessment:  Pt has attended 28 PT visits s/p L TONY performed in May 2024. 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 She made good progress in all areas but has reached a plateau in progress. She is appropriate for d/c today with HEP.       PLAN   Effective Dates: 10/9/2024 TO Plan of Care/Certification Expiration Date: 25    Goals: (Goals have been discussed and agreed upon with patient.)  Short-Term Functional Goals: Time Frame: 30 days  Pt will demo I with HEP in order to self manage symptoms. Met  Pt will be able to stand from a standard chair without using her hands. Met   Pt will improve 5 x sit to stand score to 10 sec with arm use. Not met   Discharge

## 2024-12-18 NOTE — PROGRESS NOTES
lap ~60 ft    Date:  12/18/24     Activity/Exercise Parameters   Forward walking 2 laps    Backward walking    Side stepping 1 lap   Walking march 1 lap   Walking hamstring curl 1 lap   [place] walking    [place]    Speed walking 1 lap   Side step squat    BIG walking  1 lap   Heel and toe walking 1 lap ea   Tandem walking    Walking with head turns and/or looking up and down 1 lap ea   Walking with kickboard movements    Hip flexion X20 ea   Hip abduction X20 ea   Hip extension X20 ea   Fire hydrants X20 ea   Leg circles 1 min ea   squats x20   Lunges     Heel and toe raises X20 ea   Step ups X20 ea   Lateral step ups X20 ea   Hamstring curls X20 ea   Hip hikes    Marching in place    Lateral pulsing to fatigue    NBOS balance with twisting and head turns     Overhead press    Rows     Lumbar rotation    Kickboard push/pull in semi squat    Kickboard pull down with back against wall    Tandem balance    Hamstring stretching    Hip flexor stretching    Calf stretching    Alternating toe taps to step X20 ea       THERAPEUTIC EXERCISE: ( minutes):  Exercises per grid below to improve mobility and strength.  Required moderate visual, verbal, manual and tactile cues to promote proper body alignment, promote proper body posture and promote proper body mechanics.  Progressed resistance, range, repetitions and complexity of movement as indicated.   Date:  12/18/24     Activity/Exercise Parameters   Hip stretching passively all directions --   Nustep  7.5 min warm up; L1   Heel and toe raises -   Airex balance NBOS -   Airex balance EC -   Semi tandem on airex -   3 way hip standing B hip abduction and extension: 2X 10 reps each; cues for technique and upright posture   Sit to stands chair plus airex no UE use X10; 3 sec pause in standing   bridge 2x 10; 5 sec holds   Seated marching X1 minute   Fernando modified 2x30 sec holds each leg   Gastroc stretch at bar and slant board 3x30 sec each leg   Gait >500 ft around Sports

## 2025-02-27 RX ORDER — CARBIDOPA AND LEVODOPA 25; 100 MG/1; MG/1
TABLET ORAL
Qty: 540 TABLET | Refills: 3 | Status: SHIPPED | OUTPATIENT
Start: 2025-02-27

## 2025-04-02 ENCOUNTER — TELEPHONE (OUTPATIENT)
Dept: NEUROLOGY | Age: 80
End: 2025-04-02

## 2025-04-02 NOTE — TELEPHONE ENCOUNTER
Let patient know:     Based on review of records, I would recommend that the patient consider changing her Sinemet  mg, taking it four times a day spaced every 4 hours.  This may help reduce the wearing off between doses.    So she can take according to the following schedule:    2 tablets at 6 AM, 2 tablets at 10 AM, 2 tablets at 2 PM, 2 tablets at 6 PM.    She may continue her extended release Sinemet at 9:30 as scheduled.    We can send any new prescriptions if needed.

## 2025-04-02 NOTE — TELEPHONE ENCOUNTER
Patient is currently taking Sinemet 25/100mg 2 tabs TID at 6am, 11am and then 4-5pm. The Sinemet CR she takes 2 tabs at 9:30pm. She is having wearing off in the afternoon about 30-60 minutes before her 4-5pm dose. Sometimes she also has wearing off at 9pm.

## 2025-04-07 DIAGNOSIS — G20.A2 PARKINSON'S DISEASE WITHOUT DYSKINESIA, WITH FLUCTUATING MANIFESTATIONS (HCC): Primary | ICD-10-CM

## 2025-04-07 RX ORDER — CARBIDOPA AND LEVODOPA 25; 100 MG/1; MG/1
TABLET ORAL
Qty: 540 TABLET | Refills: 3 | Status: SHIPPED | OUTPATIENT
Start: 2025-04-07

## 2025-04-16 ENCOUNTER — TELEPHONE (OUTPATIENT)
Dept: NEUROLOGY | Age: 80
End: 2025-04-16

## 2025-07-21 NOTE — OP NOTE
Operative Note    Date of Surgery: 5/8/2023       Preoperative diagnosis:  Pre-Op Diagnosis Codes:     * Primary osteoarthritis of right shoulder [M19.011]     * History of osteoporosis [Z87.39]     * Right rotator cuff tear arthropathy [M75.101, M12.811]    Postoperative diagnosis: Right Rotator cuff tear arthropathy    Surgeon(s) and Role:     * TROY Danielle MD - Primary     Assistant: Paul Siemens, first assist, assisted during the procedure. She was necessary for patient positioning, wound closure, and assistance with the major portions of the operation. Her presence decreased the operative time and potential complication rate. Anesthesia: General, regional block    Antibiotics:  Ancef 2 grams IV, vancomycin powder 1 gram.    Procedures:  Procedure(s):  RIGHT SHOULDER TOTAL ARTHROPLASTY REVERSE -REGIONAL BLOCK -23 HOUR OBSERVATION  right Reverse Total Shoulder 54477  36206- Other procedures on musculoskeletal system            0055T- Computer assisted surgical navigation   [id number]    Findings:  1. EUA -  Flexion 80 abduction 60, external rotation neutral   2. Joint -severe glenohumeral erosion with anterior joint deficiency. Hypertrophic synovium. Calcific loose bodies and possible tendon sheath. Indications / Consent: This is a patient who has continued to have poor function and pain of the right shoulder and at this point it was felt that a reverse shoulder prosthesis was a reasonable option. After previous discussions and treatments using both conservative and/or non-operative treatment options the patient elected to proceed with surgery due to continued symptoms. A review of the risks and benefits, including but not limited to infection, stiffness, injury to nerves and vessels, DVT, PE, MI, need for further operations and other anesthesia related risks was performed with the patient.  After this review and the review of the likely outcome and potential complications of the procedure, show

## 2025-07-28 ENCOUNTER — HOSPITAL ENCOUNTER (OUTPATIENT)
Dept: PHYSICAL THERAPY | Age: 80
Setting detail: RECURRING SERIES
Discharge: HOME | End: 2025-07-31
Payer: MEDICARE

## 2025-07-28 DIAGNOSIS — G89.29 CHRONIC PAIN OF RIGHT KNEE: Primary | ICD-10-CM

## 2025-07-28 DIAGNOSIS — M62.81 MUSCLE WEAKNESS (GENERALIZED): ICD-10-CM

## 2025-07-28 DIAGNOSIS — Z47.1 AFTERCARE FOLLOWING RIGHT KNEE JOINT REPLACEMENT SURGERY: ICD-10-CM

## 2025-07-28 DIAGNOSIS — M25.561 CHRONIC PAIN OF RIGHT KNEE: Primary | ICD-10-CM

## 2025-07-28 DIAGNOSIS — Z96.651 AFTERCARE FOLLOWING RIGHT KNEE JOINT REPLACEMENT SURGERY: ICD-10-CM

## 2025-07-28 PROCEDURE — 97161 PT EVAL LOW COMPLEX 20 MIN: CPT

## 2025-07-28 PROCEDURE — 97110 THERAPEUTIC EXERCISES: CPT

## 2025-07-28 ASSESSMENT — PAIN SCALES - GENERAL: PAINLEVEL_OUTOF10: 5

## 2025-07-28 NOTE — PROGRESS NOTES
Amy Solis Kristian  : 1945  Primary: Medicare Part A And B (Medicare)  Secondary:  FOR LIFE MEDICARE SUPP [facility] @ [place] [address] [place] [address]  Phone: [phone]  Fax: [phone] Plan Frequency: 2 x per week 8 weeks    Plan of Care/Certification Expiration Date: 10/26/25        Plan of Care/Certification Expiration Date:  Plan of Care/Certification Expiration Date: 10/26/25    Frequency/Duration: Plan Frequency: 2 x per week 8 weeks      Time In/Out:   Time In: 1030  Time Out: 1115      PT Visit Info:         Visit Count:  1    OUTPATIENT PHYSICAL THERAPY:   Treatment Note 2025       Episode  (Right TKA)               Treatment Diagnosis:    Chronic pain of right knee  Muscle weakness (generalized)  Aftercare following right knee joint replacement surgery  Medical/Referring Diagnosis:    Aftercare following right knee joint replacement surgery [Z47.1, Z96.651]    Referring Physician:  Stevie Freitas MD MD Orders:  PT Eval and Treat   Return MD Appt:     Date of Onset:  Onset Date: 25     Allergies:   Latex, Sulfa antibiotics, and Adhesive tape  Restrictions/Precautions:   Post Surgical Precautions: TKA      Interventions Planned (Treatment may consist of any combination of the following):     See Assessment Note    Subjective Comments:   Patient reports limited walking endurance and some medial knee pain  Initial Pain Level:     5/10  Post Session Pain Level:      5/10  Medications Last Reviewed: 2025  Updated Objective Findings:  See Evaluation Note from today  Treatment   THERAPEUTIC EXERCISE: (15 minutes):    Exercises per grid below to improve mobility and strength.  Required minimal visual and verbal cues to promote proper body alignment and promote proper body posture.  Progressed repetitions as indicated.      Date:  2025     Activity/Exercise Parameters   Quad set 10 x 5 sec   Gastroc stretch 3 x 30 sec

## 2025-07-28 NOTE — THERAPY EVALUATION
Amy Townsend  : 1945  Primary: Medicare Part A And B (Medicare)  Secondary:  FOR LIFE MEDICARE SUPP [facility] @ [place] [address] [address] [place] [address]  Phone: [phone]  Fax: [phone] Plan Frequency: 2 x per week 8 weeks    Plan of Care/Certification Expiration Date: 10/26/25        Plan of Care/Certification Expiration Date:  Plan of Care/Certification Expiration Date: 10/26/25    Frequency/Duration: Plan Frequency: 2 x per week 8 weeks      Time In/Out:   Time In: 1030  Time Out: 1115      PT Visit Info:         Visit Count:  1                OUTPATIENT PHYSICAL THERAPY:             Initial Assessment 2025               Episode (Right TKA)         Treatment Diagnosis:     Chronic pain of right knee  Muscle weakness (generalized)  Aftercare following right knee joint replacement surgery  Medical/Referring Diagnosis:    Aftercare following right knee joint replacement surgery [Z47.1, Z96.651]    Referring Physician:  Stevie Freitas MD MD Orders:  PT Eval and Treat Follow TKA rehab protocol  Return MD Appt:    Date of Onset:  Onset Date: 25     Allergies:  Latex, Sulfa antibiotics, and Adhesive tape  Restrictions/Precautions:    Post Surgical Precautions:        Medications Last Reviewed: 2025     SUBJECTIVE   History of Injury/Illness (Reason for Referral):  Amy referred to PT s/p right TKA in 2025. She reports hx of pain that limited her walking and standing tolerance. She had home health PT for a few weeks. She has hx of left TONY in .   Patient Stated Goal(s):  \"Return to walking in her neighborhood for exercise as well as gardening\"  Initial Pain Level:      5/10   Post Session Pain Level:     5/10  Past Medical History/Comorbidities:   Ms. Townsend  has a past medical history of Allergic rhinitis, Anxiety, AR (allergic rhinitis), Depression, Headache(784.0), History of blood transfusion, History of

## 2025-07-31 ENCOUNTER — APPOINTMENT (OUTPATIENT)
Dept: PHYSICAL THERAPY | Age: 80
End: 2025-07-31
Payer: MEDICARE

## 2025-08-04 ENCOUNTER — HOSPITAL ENCOUNTER (OUTPATIENT)
Dept: PHYSICAL THERAPY | Age: 80
Setting detail: RECURRING SERIES
Discharge: HOME | End: 2025-08-07
Payer: MEDICARE

## 2025-08-04 PROCEDURE — 97110 THERAPEUTIC EXERCISES: CPT

## 2025-08-04 PROCEDURE — 97140 MANUAL THERAPY 1/> REGIONS: CPT

## 2025-08-04 ASSESSMENT — PAIN SCALES - GENERAL: PAINLEVEL_OUTOF10: 5

## 2025-08-07 ENCOUNTER — HOSPITAL ENCOUNTER (OUTPATIENT)
Dept: PHYSICAL THERAPY | Age: 80
Setting detail: RECURRING SERIES
Discharge: HOME | End: 2025-08-10
Payer: MEDICARE

## 2025-08-07 PROCEDURE — 97140 MANUAL THERAPY 1/> REGIONS: CPT

## 2025-08-07 PROCEDURE — 97110 THERAPEUTIC EXERCISES: CPT

## 2025-08-07 ASSESSMENT — PAIN SCALES - GENERAL: PAINLEVEL_OUTOF10: 5

## 2025-08-12 ENCOUNTER — HOSPITAL ENCOUNTER (OUTPATIENT)
Dept: PHYSICAL THERAPY | Age: 80
Setting detail: RECURRING SERIES
Discharge: HOME | End: 2025-08-15
Payer: MEDICARE

## 2025-08-12 PROCEDURE — 97110 THERAPEUTIC EXERCISES: CPT

## 2025-08-12 PROCEDURE — 97140 MANUAL THERAPY 1/> REGIONS: CPT

## 2025-08-12 ASSESSMENT — PAIN SCALES - GENERAL: PAINLEVEL_OUTOF10: 4

## 2025-08-14 ENCOUNTER — HOSPITAL ENCOUNTER (OUTPATIENT)
Dept: PHYSICAL THERAPY | Age: 80
Setting detail: RECURRING SERIES
Discharge: HOME | End: 2025-08-17
Payer: MEDICARE

## 2025-08-14 PROCEDURE — 97110 THERAPEUTIC EXERCISES: CPT

## 2025-08-14 PROCEDURE — 97140 MANUAL THERAPY 1/> REGIONS: CPT

## 2025-08-14 ASSESSMENT — PAIN SCALES - GENERAL: PAINLEVEL_OUTOF10: 4

## 2025-08-18 ENCOUNTER — APPOINTMENT (OUTPATIENT)
Dept: ULTRASOUND IMAGING | Age: 80
End: 2025-08-18
Payer: MEDICARE

## 2025-08-18 ENCOUNTER — HOSPITAL ENCOUNTER (EMERGENCY)
Age: 80
Discharge: HOME | End: 2025-08-18
Payer: MEDICARE

## 2025-08-18 ENCOUNTER — HOSPITAL ENCOUNTER (OUTPATIENT)
Dept: PHYSICAL THERAPY | Age: 80
Discharge: HOME | End: 2025-08-21
Payer: MEDICARE

## 2025-08-18 VITALS
DIASTOLIC BLOOD PRESSURE: 84 MMHG | WEIGHT: 149.25 LBS | OXYGEN SATURATION: 96 % | BODY MASS INDEX: 23.43 KG/M2 | HEART RATE: 59 BPM | SYSTOLIC BLOOD PRESSURE: 137 MMHG | RESPIRATION RATE: 18 BRPM | TEMPERATURE: 98.1 F | HEIGHT: 67 IN

## 2025-08-18 DIAGNOSIS — L60.0 INGROWN TOENAIL OF LEFT FOOT: ICD-10-CM

## 2025-08-18 DIAGNOSIS — M79.89 RIGHT LEG SWELLING: Primary | ICD-10-CM

## 2025-08-18 PROCEDURE — 97110 THERAPEUTIC EXERCISES: CPT

## 2025-08-18 PROCEDURE — 93971 EXTREMITY STUDY: CPT

## 2025-08-18 PROCEDURE — 99284 EMERGENCY DEPT VISIT MOD MDM: CPT

## 2025-08-18 ASSESSMENT — PAIN DESCRIPTION - PAIN TYPE: TYPE: ACUTE PAIN

## 2025-08-18 ASSESSMENT — PAIN SCALES - GENERAL
PAINLEVEL_OUTOF10: 4
PAINLEVEL_OUTOF10: 3

## 2025-08-18 ASSESSMENT — PAIN DESCRIPTION - ORIENTATION: ORIENTATION: RIGHT

## 2025-08-18 ASSESSMENT — PAIN DESCRIPTION - DESCRIPTORS: DESCRIPTORS: ACHING;SORE

## 2025-08-18 ASSESSMENT — PAIN DESCRIPTION - LOCATION: LOCATION: KNEE;LEG

## 2025-08-18 ASSESSMENT — LIFESTYLE VARIABLES
HOW MANY STANDARD DRINKS CONTAINING ALCOHOL DO YOU HAVE ON A TYPICAL DAY: PATIENT DOES NOT DRINK
HOW OFTEN DO YOU HAVE A DRINK CONTAINING ALCOHOL: NEVER

## 2025-08-18 ASSESSMENT — PAIN - FUNCTIONAL ASSESSMENT: PAIN_FUNCTIONAL_ASSESSMENT: 0-10

## 2025-08-20 ENCOUNTER — HOSPITAL ENCOUNTER (OUTPATIENT)
Dept: PHYSICAL THERAPY | Age: 80
Discharge: HOME | End: 2025-08-23
Payer: MEDICARE

## 2025-08-20 PROCEDURE — 97140 MANUAL THERAPY 1/> REGIONS: CPT

## 2025-08-20 PROCEDURE — 97110 THERAPEUTIC EXERCISES: CPT

## 2025-08-20 ASSESSMENT — PAIN SCALES - GENERAL: PAINLEVEL_OUTOF10: 3

## 2025-08-25 ENCOUNTER — HOSPITAL ENCOUNTER (OUTPATIENT)
Dept: PHYSICAL THERAPY | Age: 80
Discharge: HOME | End: 2025-08-28
Payer: MEDICARE

## 2025-08-25 PROCEDURE — 97110 THERAPEUTIC EXERCISES: CPT

## 2025-08-25 PROCEDURE — 97140 MANUAL THERAPY 1/> REGIONS: CPT

## 2025-08-25 ASSESSMENT — PAIN SCALES - GENERAL: PAINLEVEL_OUTOF10: 3

## 2025-08-27 ENCOUNTER — HOSPITAL ENCOUNTER (OUTPATIENT)
Dept: PHYSICAL THERAPY | Age: 80
Discharge: HOME | End: 2025-08-30
Payer: MEDICARE

## 2025-08-27 PROCEDURE — 97110 THERAPEUTIC EXERCISES: CPT

## 2025-08-27 PROCEDURE — 97140 MANUAL THERAPY 1/> REGIONS: CPT

## 2025-08-27 ASSESSMENT — PAIN SCALES - GENERAL: PAINLEVEL_OUTOF10: 2

## (undated) DEVICE — PACK SURG PROC KNEE USER GPS

## (undated) DEVICE — WATERPROOF, BACTERIA PROOF DRESSING WITH ABSORBENT SEE THROUGH PAD: Brand: OPSITE POST-OP VISIBLE 15X10CM CTN 20

## (undated) DEVICE — SOLUTION IRRIG 3000ML 0.9% SOD CHL USP UROMATIC PLAS CONT

## (undated) DEVICE — 3M™ STERI-DRAPE™ U-DRAPE 1015: Brand: STERI-DRAPE™

## (undated) DEVICE — HOOD: Brand: FLYTE

## (undated) DEVICE — SHOULDER STABILIZATION KIT,                                    DISPOSABLE 12 PER BOX

## (undated) DEVICE — BASIC SINGLE BASIN-LF: Brand: MEDLINE INDUSTRIES, INC.

## (undated) DEVICE — NEEDLE HYPO 18GA L1.5IN PNK S STL HUB POLYPR SHLD REG BVL

## (undated) DEVICE — T-MAX DISPOSABLE FACE MASK 8 PER BOX

## (undated) DEVICE — STERILE PVP: Brand: MEDLINE INDUSTRIES, INC.

## (undated) DEVICE — SUTURE MCRYL SZ 2-0 L27IN ABSRB UD CP-1 1 L36MM 1/2 CIR REV Y266H

## (undated) DEVICE — GARMENT,MEDLINE,DVT,INT,CALF,MED, GEN2: Brand: MEDLINE

## (undated) DEVICE — SOLUTION IRRIG 1000ML STRL H2O USP PLAS POUR BTL

## (undated) DEVICE — HOOD WITH PEEL AWAY FACE SHIELD: Brand: T7PLUS

## (undated) DEVICE — SOLUTION IRRIG 1000ML 0.9% SOD CHL USP POUR PLAS BTL

## (undated) DEVICE — GLOVE SURG SZ 7 L12IN FNGR THK79MIL GRN LTX FREE

## (undated) DEVICE — GLOVE ORANGE PI 7   MSG9070

## (undated) DEVICE — TOTAL SHOULDER DR KOCH: Brand: MEDLINE INDUSTRIES, INC.